# Patient Record
Sex: MALE | Race: WHITE | HISPANIC OR LATINO | Employment: PART TIME | URBAN - METROPOLITAN AREA
[De-identification: names, ages, dates, MRNs, and addresses within clinical notes are randomized per-mention and may not be internally consistent; named-entity substitution may affect disease eponyms.]

---

## 2017-05-09 ENCOUNTER — ALLSCRIPTS OFFICE VISIT (OUTPATIENT)
Dept: OTHER | Facility: OTHER | Age: 22
End: 2017-05-09

## 2017-05-09 DIAGNOSIS — K50.90 CROHN'S DISEASE WITHOUT COMPLICATION (HCC): ICD-10-CM

## 2017-05-17 ENCOUNTER — ANESTHESIA EVENT (OUTPATIENT)
Dept: PERIOP | Facility: HOSPITAL | Age: 22
End: 2017-05-17
Payer: COMMERCIAL

## 2017-05-17 RX ORDER — SODIUM CHLORIDE, SODIUM LACTATE, POTASSIUM CHLORIDE, CALCIUM CHLORIDE 600; 310; 30; 20 MG/100ML; MG/100ML; MG/100ML; MG/100ML
125 INJECTION, SOLUTION INTRAVENOUS CONTINUOUS
Status: CANCELLED | OUTPATIENT
Start: 2017-05-17

## 2017-05-18 ENCOUNTER — ANESTHESIA (OUTPATIENT)
Dept: PERIOP | Facility: HOSPITAL | Age: 22
End: 2017-05-18
Payer: COMMERCIAL

## 2017-05-18 ENCOUNTER — HOSPITAL ENCOUNTER (OUTPATIENT)
Facility: HOSPITAL | Age: 22
Setting detail: OUTPATIENT SURGERY
Discharge: HOME | End: 2017-05-18
Attending: INTERNAL MEDICINE | Admitting: INTERNAL MEDICINE
Payer: COMMERCIAL

## 2017-05-18 ENCOUNTER — GENERIC CONVERSION - ENCOUNTER (OUTPATIENT)
Dept: OTHER | Facility: OTHER | Age: 22
End: 2017-05-18

## 2017-05-18 VITALS
BODY MASS INDEX: 28.29 KG/M2 | OXYGEN SATURATION: 98 % | HEIGHT: 69 IN | TEMPERATURE: 97.9 F | SYSTOLIC BLOOD PRESSURE: 104 MMHG | RESPIRATION RATE: 14 BRPM | WEIGHT: 191 LBS | HEART RATE: 70 BPM | DIASTOLIC BLOOD PRESSURE: 58 MMHG

## 2017-05-18 DIAGNOSIS — K50.90 CROHN'S DISEASE WITHOUT COMPLICATION (HCC): ICD-10-CM

## 2017-05-18 DIAGNOSIS — K62.5 RECTAL BLEEDING: ICD-10-CM

## 2017-05-18 PROCEDURE — 88305 TISSUE EXAM BY PATHOLOGIST: CPT | Performed by: INTERNAL MEDICINE

## 2017-05-18 RX ORDER — PROPOFOL 10 MG/ML
INJECTION, EMULSION INTRAVENOUS AS NEEDED
Status: DISCONTINUED | OUTPATIENT
Start: 2017-05-18 | End: 2017-05-18 | Stop reason: SURG

## 2017-05-18 RX ORDER — LIDOCAINE HYDROCHLORIDE 10 MG/ML
INJECTION, SOLUTION INFILTRATION; PERINEURAL AS NEEDED
Status: DISCONTINUED | OUTPATIENT
Start: 2017-05-18 | End: 2017-05-18 | Stop reason: SURG

## 2017-05-18 RX ORDER — SODIUM CHLORIDE, SODIUM LACTATE, POTASSIUM CHLORIDE, CALCIUM CHLORIDE 600; 310; 30; 20 MG/100ML; MG/100ML; MG/100ML; MG/100ML
INJECTION, SOLUTION INTRAVENOUS CONTINUOUS PRN
Status: DISCONTINUED | OUTPATIENT
Start: 2017-05-18 | End: 2017-05-18 | Stop reason: SURG

## 2017-05-18 RX ADMIN — PROPOFOL 50 MG: 10 INJECTION, EMULSION INTRAVENOUS at 07:56

## 2017-05-18 RX ADMIN — PROPOFOL 150 MG: 10 INJECTION, EMULSION INTRAVENOUS at 07:45

## 2017-05-18 RX ADMIN — PROPOFOL 50 MG: 10 INJECTION, EMULSION INTRAVENOUS at 07:46

## 2017-05-18 RX ADMIN — PROPOFOL 50 MG: 10 INJECTION, EMULSION INTRAVENOUS at 07:54

## 2017-05-18 RX ADMIN — SODIUM CHLORIDE, SODIUM LACTATE, POTASSIUM CHLORIDE, AND CALCIUM CHLORIDE: .6; .31; .03; .02 INJECTION, SOLUTION INTRAVENOUS at 07:19

## 2017-05-18 RX ADMIN — PROPOFOL 50 MG: 10 INJECTION, EMULSION INTRAVENOUS at 07:49

## 2017-05-18 RX ADMIN — LIDOCAINE HYDROCHLORIDE 50 MG: 10 INJECTION, SOLUTION INFILTRATION; PERINEURAL at 07:45

## 2017-05-18 RX ADMIN — PROPOFOL 50 MG: 10 INJECTION, EMULSION INTRAVENOUS at 07:52

## 2017-09-07 ENCOUNTER — GENERIC CONVERSION - ENCOUNTER (OUTPATIENT)
Dept: OTHER | Facility: OTHER | Age: 22
End: 2017-09-07

## 2017-09-07 DIAGNOSIS — K50.90 CROHN'S DISEASE WITHOUT COMPLICATION (HCC): ICD-10-CM

## 2018-01-12 VITALS
HEIGHT: 69 IN | BODY MASS INDEX: 29.33 KG/M2 | WEIGHT: 198 LBS | SYSTOLIC BLOOD PRESSURE: 118 MMHG | DIASTOLIC BLOOD PRESSURE: 60 MMHG

## 2018-01-22 VITALS
BODY MASS INDEX: 29.62 KG/M2 | HEIGHT: 69 IN | DIASTOLIC BLOOD PRESSURE: 70 MMHG | SYSTOLIC BLOOD PRESSURE: 118 MMHG | WEIGHT: 200 LBS

## 2018-02-08 ENCOUNTER — OFFICE VISIT (OUTPATIENT)
Dept: GASTROENTEROLOGY | Facility: CLINIC | Age: 23
End: 2018-02-08
Payer: COMMERCIAL

## 2018-02-08 VITALS
HEART RATE: 80 BPM | SYSTOLIC BLOOD PRESSURE: 115 MMHG | BODY MASS INDEX: 29.2 KG/M2 | WEIGHT: 204 LBS | HEIGHT: 70 IN | DIASTOLIC BLOOD PRESSURE: 60 MMHG

## 2018-02-08 DIAGNOSIS — K50.00 CROHN'S DISEASE OF SMALL INTESTINE WITHOUT COMPLICATION (HCC): Primary | ICD-10-CM

## 2018-02-08 PROCEDURE — 99213 OFFICE O/P EST LOW 20 MIN: CPT | Performed by: INTERNAL MEDICINE

## 2018-02-08 RX ORDER — ADALIMUMAB 40MG/0.8ML
KIT SUBCUTANEOUS
COMMUNITY
Start: 2018-01-26 | End: 2018-07-20 | Stop reason: SDUPTHER

## 2018-02-08 NOTE — PROGRESS NOTES
[facility]      Chief Complaint:  Crohn's disease       HPI:  Jurgen Duarte is a 20y o  year old male who presents with a history of Crohn's disease  Patient has been stable on Humira  He feels slightly fatigued after having his blood drawn but other than this he has no complaints  No joint pains  No ocular complaints  No skin lesions  No dizziness  No nausea or vomiting  No abdominal pain  He has 2-3 normal bowel movements a day which is his usual pattern  No nocturnal symptomatology  Patient denies any weight loss fever or chills  No rectal bleeding  No melena or hematochezia  No other symptomatology at the present time  Historical Information   Past Medical History:   Diagnosis Date    Crohn's disease [facility])      Past Surgical History:   Procedure Laterality Date    [place] COLONOSCOPY FLX DX W/COLLJ [facility] WHEN PFRMD N/A 5/18/2017    Procedure: COLONOSCOPY;  Surgeon: Leslie Arndt MD;  Location: [facility]; Service: Gastroenterology     Social History   History   Alcohol Use No     History   Drug Use No     History   Smoking Status    Never Smoker   Smokeless Tobacco    Not on file     No family history on file  Current Medications: has a current medication list which includes the following prescription(s): adalimumab and humira pen  Review of Systems: Review of Systems   Constitutional: Negative for appetite change, fatigue, fever and unexpected weight change  As per HPI   HENT: Negative for nosebleeds, postnasal drip, sore throat, trouble swallowing and voice change  Eyes: Negative for pain and visual disturbance  Respiratory: Negative for cough, choking, chest tightness and shortness of breath  Cardiovascular: Negative for chest pain, palpitations and leg swelling  Gastrointestinal: Negative for abdominal distention, abdominal pain, anal bleeding, blood in stool, constipation, diarrhea, nausea, rectal pain and vomiting     Endocrine: Negative for cold intolerance, heat intolerance, polydipsia, polyphagia and polyuria  Genitourinary: Negative for difficulty urinating, flank pain, frequency and hematuria  Musculoskeletal: Negative for arthralgias, back pain, joint swelling, myalgias and neck pain  Skin: Negative for color change and rash  Allergic/Immunologic: Negative for immunocompromised state  Neurological: Negative for dizziness, tremors, seizures, syncope, speech difficulty, weakness, light-headedness and headaches  Hematological: Negative for adenopathy  Does not bruise/bleed easily  Psychiatric/Behavioral: Negative for confusion and dysphoric mood  The patient is not nervous/anxious  Physical Exam: Physical Exam   Constitutional: He is oriented to person, place, and time  He appears well-developed and well-nourished  HENT:   Head: Normocephalic  Nose: Nose normal    Mouth/Throat: Oropharynx is clear and moist    Eyes: Conjunctivae and EOM are normal  Pupils are equal, round, and reactive to light  Neck: Normal range of motion  Neck supple  Cardiovascular: Normal rate, regular rhythm, normal heart sounds and intact distal pulses  Pulmonary/Chest: Effort normal and breath sounds normal    Abdominal: Soft  Bowel sounds are normal    Musculoskeletal: Normal range of motion  Neurological: He is alert and oriented to person, place, and time  Skin: Skin is warm and dry  Psychiatric: He has a normal mood and affect  His behavior is normal        Labs:   Results Reviewed               X-Rays & Procedures:   No orders to display       Assessment & Plan: Assessment/Plan    Stable Crohn's disease  Still awaiting lab results  Plan is to see the patient in 6 months  Repeat CBC, LFTs, CRP at that time  No changes in medication    Continue Humira

## 2018-02-21 ENCOUNTER — TELEPHONE (OUTPATIENT)
Dept: GASTROENTEROLOGY | Facility: CLINIC | Age: 23
End: 2018-02-21

## 2018-03-09 ENCOUNTER — TELEPHONE (OUTPATIENT)
Dept: GASTROENTEROLOGY | Facility: CLINIC | Age: 23
End: 2018-03-09

## 2018-03-09 NOTE — TELEPHONE ENCOUNTER
ptn mother wants to know if Dr Rachel Arevalo can order a PPD test the ptn insurance company is asking for one and he doesn't have a PCP

## 2018-03-12 ENCOUNTER — TRANSCRIBE ORDERS (OUTPATIENT)
Dept: GASTROENTEROLOGY | Facility: CLINIC | Age: 23
End: 2018-03-12

## 2018-03-12 DIAGNOSIS — K52.9 COLITIS: Primary | ICD-10-CM

## 2018-07-20 DIAGNOSIS — K50.919 CROHN'S DISEASE WITH COMPLICATION, UNSPECIFIED GASTROINTESTINAL TRACT LOCATION (HCC): Primary | ICD-10-CM

## 2018-07-20 RX ORDER — ADALIMUMAB 40MG/0.8ML
40 KIT SUBCUTANEOUS
Qty: 6 EACH | Refills: 6 | Status: SHIPPED | OUTPATIENT
Start: 2018-07-20 | End: 2019-04-03 | Stop reason: SDUPTHER

## 2018-09-06 ENCOUNTER — OFFICE VISIT (OUTPATIENT)
Dept: GASTROENTEROLOGY | Facility: CLINIC | Age: 23
End: 2018-09-06
Payer: COMMERCIAL

## 2018-09-06 VITALS
HEART RATE: 80 BPM | HEIGHT: 70 IN | BODY MASS INDEX: 29.2 KG/M2 | SYSTOLIC BLOOD PRESSURE: 112 MMHG | DIASTOLIC BLOOD PRESSURE: 70 MMHG | WEIGHT: 204 LBS

## 2018-09-06 DIAGNOSIS — K50.10 CROHN'S DISEASE OF COLON WITHOUT COMPLICATION (HCC): Primary | ICD-10-CM

## 2018-09-06 PROCEDURE — 99213 OFFICE O/P EST LOW 20 MIN: CPT | Performed by: INTERNAL MEDICINE

## 2018-09-06 NOTE — PROGRESS NOTES
[facility]      Chief Complaint: Follow up     HPI:  Judge Zhao is a 24 y o   male who presents here today for follow up of Crohn's disease  He has been on Humira for the past five years which he is tolerating well  I reviewed his most recent labs which showed normal CBC, LFT and normal CRP  He has been taking daily probiotics which he feels has helped him greatly  His bowel movements have been at baseline  Review of Systems:   Constitutional: No fever or chills, feels well, no tiredness, no recent weight gain or weight loss  HENT: No complaints of earache, no hearing loss, no nosebleeds, no nasal discharge, no sore throat, no hoarseness  Eyes: No complaints of eye pain, no red eyes, no discharge from eyes, no itchy eyes  Cardiovascular: No complaints of slow heart rate, no fast heart rate, no chest pain, no palpitations, no leg claudication, no lower extremity edema  Respiratory: No complaints of shortness of breath, no wheezing, no cough, no SOB on exertion, no orthopnea  Gastrointestinal: As noted in HPI  Genitourinary: No complaints of dysuria, no incontinence, no hesitancy, no nocturia  Musculoskeletal: No complaints of arthralgia, no myalgias, no joint swelling or stiffness, no limb pain or swelling  Neurological: No complaints of headache, no confusion, no convulsions, no numbness or tingling, no dizziness or fainting, no limb weakness, no difficulty walking  Skin: No complaints of skin rash or skin lesions, no itching, no skin wound, no dry skin  Hematological/Lymphatic: No complaints of swollen glands, does not bleed easy  Allergic/Immunologic: No immunocompromised state  Endocrine:  No complaints of polyuria, no polydipsia  Psychiatric/Behavioral: is not suicidal, no sleep disturbances, no anxiety or depression, no change in personality, no emotional problems         Historical Information   Past Medical History:   Diagnosis Date    Crohn's disease [facility])      Past Surgical History:   Procedure Laterality Date    [place] COLONOS[place] FLX DX [facility]COLLJ [facility] WHEN PFRMD N/A 5/18/2017    Procedure: COLONOSCOPY;  Surgeon: Ron Winters MD;  Location: [facility]; Service: Gastroenterology     Social History   History   Alcohol Use    Yes     Comment: Social     History   Drug Use No     History   Smoking Status    Never Smoker   Smokeless Tobacco    Not on file     Family History   Problem Relation Age of Onset    Diabetes Father          Current Medications: has a current medication list which includes the following prescription(s): adalimumab and humira pen  Vital Signs: /70   Pulse 80   Ht 5' 10" (1 778 m)   Wt 92 5 kg (204 lb)   BMI 29 27 kg/m²       Physical Exam:   Constitutional  General Appearance: No acute distress, well appearing and well nourished  Head  Normocephalic  Eyes  Conjunctivae and lids: No swelling, erythema, or discharge  Pupils and irises: Equal, round and reactive to light  Ears, Nose, Mouth, and Throat  External inspection of ears and nose: Normal  Nasal mucosa, septum and turbinates: Normal without edema or erythema/   Oropharynx: Normal with no erythema, edema, exudate or lesions  Neck  Normal range of motion  Neck supple  Cardiovascular  Auscultation of the heart: Normal rate and rhythm, normal S1 and S2 without murmurs  Examination of the extremities for edema and/or varicosities: Normal  Pulmonary/Chest  Respiratory effort: No increased work of breathing or signs of respiratory distress  Auscultation of lungs: Clear to auscultation, equal breath sounds bilaterally, no wheezes, rales, no rhonchi  Abdomen  Abdomen: Non-tender, no masses  Liver and spleen: No hepatomegaly or splenomegaly  Musculoskeletal  Gait and station: normal   Digits and Nails: normal without clubbing or cyanosis  Inspection/palpation of joints, bones, and muscles: Normal  Neurological  No nystagmus or asterixis  Skin  Skin and subcutaneous tissue: Normal without rashes or lesions  Lymphatic  Palpation of the lymph nodes in neck: No lymphadenopathy  Psychiatric  Orientation to person, place and time: Normal   Mood and affect: Normal          Labs:  No results found for: ALBUMIN, ALT, AST, BUN, CALCIUM, CL, CHOL, CO2, CREATININE, GFRAA, GFRNONAA, GLU, HDL, HCT, HGB, HGBA1C, LDL, MG, PHOS, PLT, K, PSA, NA, TRIG, WBC      X-Rays & Procedures:   No orders to display           ______________________________________________________________________      Assessment & Plan:       Crohn's disease of colon without complication ([facility] [address] )  Currently well controlled with Humira  He will call if refill needed  Continue daily probiotic - I recommend he switch to VSL #3  I will repeat CBC, LFT and CRP to be done in six months  Follow up in six months time        Attestation:   By signing my name below, ITeresa, attest that this documentation has been prepared under the direction and in the presence of Otilio Biggs MD  Electronically Signed: Whitney Win  09/06/18       Otilio HOFF, personally performed the services described in this documentation  All medical record entries made by the scribe were at my direction and in my presence  I have reviewed the chart and discharge instructions and agree that the record reflects my personal performance and is accurate and complete   Otilio Biggs MD  09/06/18

## 2018-09-13 ENCOUNTER — TELEPHONE (OUTPATIENT)
Dept: GASTROENTEROLOGY | Facility: CLINIC | Age: 23
End: 2018-09-13

## 2019-03-07 ENCOUNTER — OFFICE VISIT (OUTPATIENT)
Dept: GASTROENTEROLOGY | Facility: CLINIC | Age: 24
End: 2019-03-07
Payer: COMMERCIAL

## 2019-03-07 VITALS
BODY MASS INDEX: 30.06 KG/M2 | WEIGHT: 210 LBS | SYSTOLIC BLOOD PRESSURE: 120 MMHG | HEART RATE: 78 BPM | DIASTOLIC BLOOD PRESSURE: 74 MMHG | HEIGHT: 70 IN

## 2019-03-07 DIAGNOSIS — K50.00 CROHN'S DISEASE OF SMALL INTESTINE WITHOUT COMPLICATION (HCC): Primary | ICD-10-CM

## 2019-03-07 PROCEDURE — 99213 OFFICE O/P EST LOW 20 MIN: CPT | Performed by: INTERNAL MEDICINE

## 2019-03-07 NOTE — PROGRESS NOTES
[facility]      Chief Complaint: Crohn's disease follow-up  HPI:  Claudia Glover is a 24 y o   male who presents with a history of Crohn's disease here for a follow-up  Pt is feeling well  He continues to remain stable on Humira  His bowel movements are generally normal, although he reports occasional hard stools and occasional blood in stool  No joint pains  No ocular complaints  No skin lesions  No dark urin  No jaundice  No dizziness  No nausea or vomiting  No abdominal pain  No other symptomatology at the present time  His blood work was normal  LFTs and CBC were normal  CRP was within normal limits as well  He takes the probiotic Align daily  Review of Systems:   Constitutional: No fever or chills, feels well, no tiredness, no recent weight gain or weight loss  HENT: No complaints of earache, no hearing loss, no nosebleeds, no nasal discharge, no sore throat, no hoarseness  Eyes: No complaints of eye pain, no red eyes, no discharge from eyes, no itchy eyes  Cardiovascular: No complaints of slow heart rate, no fast heart rate, no chest pain, no palpitations, no leg claudication, no lower extremity edema  Respiratory: No complaints of shortness of breath, no wheezing, no cough, no SOB on exertion, no orthopnea  Gastrointestinal: As noted in HPI  Genitourinary: No complaints of dysuria, no incontinence, no hesitancy, no nocturia  Musculoskeletal: No complaints of arthralgia, no myalgias, no joint swelling or stiffness, no limb pain or swelling  Neurological: No complaints of headache, no confusion, no convulsions, no numbness or tingling, no dizziness or fainting, no limb weakness, no difficulty walking  Skin: No complaints of skin rash or skin lesions, no itching, no skin wound, no dry skin  Hematological/Lymphatic: No complaints of swollen glands, does not bleed easy  Allergic/Immunologic: No immunocompromised state    Endocrine:  No complaints of polyuria, no polydipsia  Psychiatric/Behavioral: is not suicidal, no sleep disturbances, no anxiety or depression, no change in personality, no emotional problems  Historical Information   Past Medical History:   Diagnosis Date    Crohn's disease [facility])      Past Surgical History:   Procedure Laterality Date    [place] COLONOSCOPY FLX DX W/COLLJ [facility] REHABILITATION WHEN PFRMD N/A 5/18/2017    Procedure: COLONOSCOPY;  Surgeon: Vicente Gloria MD;  Location: [facility]; Service: Gastroenterology     Social History   Social History     Substance and Sexual Activity   Alcohol Use Yes    Comment: Social     Social History     Substance and Sexual Activity   Drug Use No     Social History     Tobacco Use   Smoking Status Never Smoker   Smokeless Tobacco Never Used     Family History   Problem Relation Age of Onset    Diabetes Father          Current Medications: has a current medication list which includes the following prescription(s): adalimumab and humira pen  Vital Signs: /74   Pulse 78   Ht 5' 10" (1 778 m)   Wt 95 3 kg (210 lb)   BMI 30 13 kg/m²       Physical Exam:   Constitutional  General Appearance: No acute distress, well appearing and well nourished  Head  Normocephalic  Eyes  Conjunctivae and lids: No swelling, erythema, or discharge  Pupils and irises: Equal, round and reactive to light  Ears, Nose, Mouth, and Throat  External inspection of ears and nose: Normal  Nasal mucosa, septum and turbinates: Normal without edema or erythema/   Oropharynx: Normal with no erythema, edema, exudate or lesions  Neck  Normal range of motion  Neck supple  Cardiovascular  Auscultation of the heart: Normal rate and rhythm, normal S1 and S2 without murmurs  Examination of the extremities for edema and/or varicosities: Normal  Pulmonary/Chest  Respiratory effort: No increased work of breathing or signs of respiratory distress     Auscultation of lungs: Clear to auscultation, equal breath sounds bilaterally, no wheezes, rales, no rhonchi  Abdomen  Abdomen: Non-tender, no masses  Liver and spleen: No hepatomegaly or splenomegaly  Musculoskeletal  Gait and station: normal   Digits and Nails: normal without clubbing or cyanosis  Inspection/palpation of joints, bones, and muscles: Normal  Neurological  No nystagmus or asterixis  Skin  Skin and subcutaneous tissue: Normal without rashes or lesions  Lymphatic  Palpation of the lymph nodes in neck: No lymphadenopathy  Psychiatric  Orientation to person, place and time: Normal   Mood and affect: Normal          Labs:  No results found for: ALT, AST, BUN, CALCIUM, CL, CHOL, CO2, CREATININE, GFRAA, GFRNONAA, HDL, HCT, HGB, HGBA1C, LDL, MG, PHOS, PLT, K, PSA, NA, TRIG, WBC      X-Rays & Procedures:   No orders to display           ______________________________________________________________________      Assessment & Plan:     Diagnoses and all orders for this visit:    1  Crohn's disease of small intestine without complication ([facility] [address] )  Stable Crohn's disease  Pt is doing well  Plan is to see the patient in 6 months  Repeat CBC, LFTs, CRP at that time  Continue Humira

## 2019-04-01 ENCOUNTER — TELEPHONE (OUTPATIENT)
Dept: GASTROENTEROLOGY | Facility: CLINIC | Age: 24
End: 2019-04-01

## 2019-04-03 DIAGNOSIS — K50.919 CROHN'S DISEASE WITH COMPLICATION, UNSPECIFIED GASTROINTESTINAL TRACT LOCATION (HCC): ICD-10-CM

## 2019-04-03 RX ORDER — ADALIMUMAB 40MG/0.8ML
40 KIT SUBCUTANEOUS
Qty: 6 EACH | Refills: 6 | Status: SHIPPED | OUTPATIENT
Start: 2019-04-03 | End: 2020-03-24

## 2019-10-04 ENCOUNTER — TRANSCRIBE ORDERS (OUTPATIENT)
Dept: GASTROENTEROLOGY | Facility: CLINIC | Age: 24
End: 2019-10-04

## 2019-10-04 ENCOUNTER — OFFICE VISIT (OUTPATIENT)
Dept: GASTROENTEROLOGY | Facility: CLINIC | Age: 24
End: 2019-10-04
Payer: COMMERCIAL

## 2019-10-04 VITALS
RESPIRATION RATE: 16 BRPM | BODY MASS INDEX: 30.64 KG/M2 | SYSTOLIC BLOOD PRESSURE: 120 MMHG | HEART RATE: 76 BPM | WEIGHT: 214 LBS | DIASTOLIC BLOOD PRESSURE: 70 MMHG | HEIGHT: 70 IN

## 2019-10-04 DIAGNOSIS — K50.00 CROHN'S DISEASE OF SMALL INTESTINE WITHOUT COMPLICATION (HCC): Primary | ICD-10-CM

## 2019-10-04 PROCEDURE — 99214 OFFICE O/P EST MOD 30 MIN: CPT | Performed by: INTERNAL MEDICINE

## 2019-10-04 NOTE — PROGRESS NOTES
[facility]      Chief Complaint:  Crohn's disease    HPI:  Cheryl Mckeon is a 21 y o   male who presents with Crohn's disease of the small intestine for many years  Patient is currently on  Humira  He is doing quite well  He has 2 or 3 bowel movements a day  Very rare blood  His weight is stable  He had a recent voluntary weight loss  No fever or chills  No nausea or vomiting  No jaundice  No change in the color of urine or stool  No nocturnal symptomatology  No exertional symptomatology  Patient has no joint complaints  No ocular complaints  No skin complaints  No evidence of extraintestinal manifestation of disease  Recent blood work including CRP, CBC, LFTs, all completely normal   Overall he is doing quite well  He has not had a TB test and quite some time  He has no other GI complaints  No other complaints of any kind  Review of Systems:   Constitutional: No fever or chills, feels well, no tiredness, no recent weight gain or weight loss  HENT: No complaints of earache, no hearing loss, no nosebleeds, no nasal discharge, no sore throat, no hoarseness  Eyes: No complaints of eye pain, no red eyes, no discharge from eyes, no itchy eyes  Cardiovascular: No complaints of slow heart rate, no fast heart rate, no chest pain, no palpitations, no leg claudication, no lower extremity edema  Respiratory: No complaints of shortness of breath, no wheezing, no cough, no SOB on exertion, no orthopnea  Gastrointestinal: As noted in HPI  Genitourinary: No complaints of dysuria, no incontinence, no hesitancy, no nocturia  Musculoskeletal: No complaints of arthralgia, no myalgias, no joint swelling or stiffness, no limb pain or swelling  Neurological: No complaints of headache, no confusion, no convulsions, no numbness or tingling, no dizziness or fainting, no limb weakness, no difficulty walking      Skin: No complaints of skin rash or skin lesions, no itching, no skin wound, no dry skin  Hematological/Lymphatic: No complaints of swollen glands, does not bleed easy  Allergic/Immunologic: No immunocompromised state  Endocrine:  No complaints of polyuria, no polydipsia  Psychiatric/Behavioral: is not suicidal, no sleep disturbances, no anxiety or depression, no change in personality, no emotional problems  Historical Information   Past Medical History:   Diagnosis Date    Crohn's disease [facility])      Past Surgical History:   Procedure Laterality Date    [place] COLONOSCOPY FLX DX W/COLLJ [facility] WHEN PFRMD N/A 5/18/2017    Procedure: COLONOSCOPY;  Surgeon: Ela Luna MD;  Location: [facility]; Service: Gastroenterology     Social History   Social History     Substance and Sexual Activity   Alcohol Use Yes    Comment: Social     Social History     Substance and Sexual Activity   Drug Use No     Social History     Tobacco Use   Smoking Status Never Smoker   Smokeless Tobacco Never Used     Family History   Problem Relation Age of Onset    Diabetes Father          Current Medications: has a current medication list which includes the following prescription(s): adalimumab and humira pen  Vital Signs: /70   Pulse 76   Resp 16   Ht 5' 10" (1 778 m)   Wt 97 1 kg (214 lb)   BMI 30 71 kg/m²       Physical Exam:   Constitutional  General Appearance: No acute distress, well appearing and well nourished  Head  Normocephalic  Eyes  Conjunctivae and lids: No swelling, erythema, or discharge  Pupils and irises: Equal, round and reactive to light  Ears, Nose, Mouth, and Throat  External inspection of ears and nose: Normal  Nasal mucosa, septum and turbinates: Normal without edema or erythema/   Oropharynx: Normal with no erythema, edema, exudate or lesions  Neck  Normal range of motion  Neck supple  Cardiovascular  Auscultation of the heart: Normal rate and rhythm, normal S1 and S2 without murmurs    Examination of the extremities for edema and/or varicosities: Normal  Pulmonary/Chest  Respiratory effort: No increased work of breathing or signs of respiratory distress  Auscultation of lungs: Clear to auscultation, equal breath sounds bilaterally, no wheezes, rales, no rhonchi  Abdomen  Abdomen: Non-tender, no masses  Liver and spleen: No hepatomegaly or splenomegaly  Musculoskeletal  Gait and station: normal   Digits and Nails: normal without clubbing or cyanosis  Inspection/palpation of joints, bones, and muscles: Normal  Neurological  No nystagmus or asterixis  Skin  Skin and subcutaneous tissue: Normal without rashes or lesions  Lymphatic  Palpation of the lymph nodes in neck: No lymphadenopathy  Psychiatric  Orientation to person, place and time: Normal   Mood and affect: Normal          Labs:  No results found for: ALT, AST, BUN, CALCIUM, CL, CHOL, CO2, CREATININE, GFRAA, GFRNONAA, HDL, HCT, HGB, HGBA1C, LDL, MG, PHOS, PLT, K, PSA, NA, TRIG, WBC      X-Rays & Procedures:   No orders to display           ______________________________________________________________________      Assessment & Plan:     Dwight Kearney was seen today for follow-up  Diagnoses and all orders for this visit:    [facility]  -     Quantiferon TB Gold Plus; Future  -     Hepatic function panel; Future  -     C-reactive protein; Future  -     CBC; Future      Patient will continue Humira  We require another QuantiFERON gold  He will repeat his LFTs CBC and CRP in 6 months and see me after that  He is currently stable

## 2019-10-24 ENCOUNTER — TELEPHONE (OUTPATIENT)
Dept: GASTROENTEROLOGY | Facility: CLINIC | Age: 24
End: 2019-10-24

## 2019-10-24 NOTE — LETTER
October 24, 2019         Patient: Dorothy Mitchell   YOB: 1995               To Whom This May Concern,      Dorothy Mitchell is currently under my professional care  He is currently taking HUMIRA but he is able to have the TDAP and Meningitis vaccine  Any questions or concerns please give our office a call at [phone] OPT 2  Thank Shadia Woods MD

## 2019-10-24 NOTE — TELEPHONE ENCOUNTER
ptn wants to know from Dr Lou Martin if hes allowed to get the TDap and menegitis vaccines with him being on humira   If approved please fax a letter to [phone]

## 2019-10-28 ENCOUNTER — TELEPHONE (OUTPATIENT)
Dept: GASTROENTEROLOGY | Facility: CLINIC | Age: 24
End: 2019-10-28

## 2019-10-28 NOTE — TELEPHONE ENCOUNTER
[facility] CALLED AND LEFT A LMOM STATING THE PT'S HUMIRA NEEDED A PRIOR AUTHORIZATION  / CALLED [facility] TO OBTAIN AUTHORIZATION FOR THE MEDICATION, GAVE CLINICALS OVER THE PHONE  STATED THE MEDICATION WAS APPROVED FOR 6 MONTHS  THE WILL FAX OVER THE APPROVAL

## 2019-11-10 ENCOUNTER — HOSPITAL ENCOUNTER (EMERGENCY)
Facility: HOSPITAL | Age: 24
Discharge: HOME | End: 2019-11-10
Attending: EMERGENCY MEDICINE | Admitting: EMERGENCY MEDICINE
Payer: COMMERCIAL

## 2019-11-10 ENCOUNTER — APPOINTMENT (EMERGENCY)
Dept: CT IMAGING | Facility: HOSPITAL | Age: 24
End: 2019-11-10
Payer: COMMERCIAL

## 2019-11-10 VITALS
TEMPERATURE: 97.9 F | BODY MASS INDEX: 27.27 KG/M2 | HEIGHT: 70 IN | OXYGEN SATURATION: 99 % | SYSTOLIC BLOOD PRESSURE: 111 MMHG | WEIGHT: 190.48 LBS | HEART RATE: 80 BPM | RESPIRATION RATE: 18 BRPM | DIASTOLIC BLOOD PRESSURE: 69 MMHG

## 2019-11-10 DIAGNOSIS — R56.9 SEIZURE-LIKE ACTIVITY (HCC): Primary | ICD-10-CM

## 2019-11-10 LAB
ALBUMIN SERPL BCP-MCNC: 2.9 G/DL (ref 3.5–5)
ALP SERPL-CCNC: 53 U/L (ref 46–116)
ALT SERPL W P-5'-P-CCNC: 14 U/L (ref 12–78)
AMPHETAMINES SERPL QL SCN: NEGATIVE
ANION GAP SERPL CALCULATED.3IONS-SCNC: 12 MMOL/L (ref 4–13)
AST SERPL W P-5'-P-CCNC: 23 U/L (ref 5–45)
ATRIAL RATE: 84 BPM
BACTERIA UR QL AUTO: ABNORMAL /HPF
BARBITURATES UR QL: NEGATIVE
BASOPHILS # BLD AUTO: 0.03 THOUSANDS/ΜL (ref 0–0.1)
BASOPHILS NFR BLD AUTO: 0 % (ref 0–1)
BENZODIAZ UR QL: NEGATIVE
BILIRUB DIRECT SERPL-MCNC: 0.02 MG/DL (ref 0–0.2)
BILIRUB SERPL-MCNC: 0.4 MG/DL (ref 0.2–1)
BILIRUB UR QL STRIP: NEGATIVE
BUN SERPL-MCNC: 9 MG/DL (ref 5–25)
CALCIUM SERPL-MCNC: 8 MG/DL (ref 8.3–10.1)
CHLORIDE SERPL-SCNC: 108 MMOL/L (ref 100–108)
CLARITY UR: CLEAR
CO2 SERPL-SCNC: 19 MMOL/L (ref 21–32)
COCAINE UR QL: NEGATIVE
COLOR UR: YELLOW
CREAT SERPL-MCNC: 0.5 MG/DL (ref 0.6–1.3)
EOSINOPHIL # BLD AUTO: 0.06 THOUSAND/ΜL (ref 0–0.61)
EOSINOPHIL NFR BLD AUTO: 1 % (ref 0–6)
ERYTHROCYTE [DISTWIDTH] IN BLOOD BY AUTOMATED COUNT: 12.3 % (ref 11.6–15.1)
ETHANOL SERPL-MCNC: 136 MG/DL (ref 0–3)
GFR SERPL CREATININE-BSD FRML MDRD: 151 ML/MIN/1.73SQ M
GLUCOSE SERPL-MCNC: 99 MG/DL (ref 65–140)
GLUCOSE UR STRIP-MCNC: NEGATIVE MG/DL
HCT VFR BLD AUTO: 43.4 % (ref 36.5–49.3)
HGB BLD-MCNC: 13.7 G/DL (ref 12–17)
HGB UR QL STRIP.AUTO: ABNORMAL
IMM GRANULOCYTES # BLD AUTO: 0.02 THOUSAND/UL (ref 0–0.2)
IMM GRANULOCYTES NFR BLD AUTO: 0 % (ref 0–2)
KETONES UR STRIP-MCNC: NEGATIVE MG/DL
LEUKOCYTE ESTERASE UR QL STRIP: NEGATIVE
LYMPHOCYTES # BLD AUTO: 2.97 THOUSANDS/ΜL (ref 0.6–4.47)
LYMPHOCYTES NFR BLD AUTO: 37 % (ref 14–44)
MCH RBC QN AUTO: 29.4 PG (ref 26.8–34.3)
MCHC RBC AUTO-ENTMCNC: 31.6 G/DL (ref 31.4–37.4)
MCV RBC AUTO: 93 FL (ref 82–98)
METHADONE UR QL: NEGATIVE
MONOCYTES # BLD AUTO: 0.67 THOUSAND/ΜL (ref 0.17–1.22)
MONOCYTES NFR BLD AUTO: 8 % (ref 4–12)
NEUTROPHILS # BLD AUTO: 4.33 THOUSANDS/ΜL (ref 1.85–7.62)
NEUTS SEG NFR BLD AUTO: 54 % (ref 43–75)
NITRITE UR QL STRIP: NEGATIVE
NON-SQ EPI CELLS URNS QL MICRO: ABNORMAL /HPF
NRBC BLD AUTO-RTO: 0 /100 WBCS
OPIATES UR QL SCN: NEGATIVE
P AXIS: 55 DEGREES
PCP UR QL: NEGATIVE
PH UR STRIP.AUTO: 5.5 [PH]
PLATELET # BLD AUTO: 248 THOUSANDS/UL (ref 149–390)
PMV BLD AUTO: 9.2 FL (ref 8.9–12.7)
POTASSIUM SERPL-SCNC: 4.6 MMOL/L (ref 3.5–5.3)
PR INTERVAL: 154 MS
PROT SERPL-MCNC: 6.9 G/DL (ref 6.4–8.2)
PROT UR STRIP-MCNC: NEGATIVE MG/DL
QRS AXIS: 76 DEGREES
QRSD INTERVAL: 98 MS
QT INTERVAL: 374 MS
QTC INTERVAL: 441 MS
RBC # BLD AUTO: 4.66 MILLION/UL (ref 3.88–5.62)
RBC #/AREA URNS AUTO: ABNORMAL /HPF
SODIUM SERPL-SCNC: 139 MMOL/L (ref 136–145)
SP GR UR STRIP.AUTO: 1.01 (ref 1–1.03)
T WAVE AXIS: 36 DEGREES
THC UR QL: NEGATIVE
TROPONIN I SERPL-MCNC: <0.02 NG/ML
UROBILINOGEN UR QL STRIP.AUTO: 0.2 E.U./DL
VENTRICULAR RATE: 84 BPM
WBC # BLD AUTO: 8.08 THOUSAND/UL (ref 4.31–10.16)
WBC #/AREA URNS AUTO: ABNORMAL /HPF

## 2019-11-10 PROCEDURE — 84484 ASSAY OF TROPONIN QUANT: CPT | Performed by: EMERGENCY MEDICINE

## 2019-11-10 PROCEDURE — 85025 COMPLETE CBC W/AUTO DIFF WBC: CPT | Performed by: EMERGENCY MEDICINE

## 2019-11-10 PROCEDURE — 96361 HYDRATE IV INFUSION ADD-ON: CPT

## 2019-11-10 PROCEDURE — 80307 DRUG TEST PRSMV CHEM ANLYZR: CPT | Performed by: EMERGENCY MEDICINE

## 2019-11-10 PROCEDURE — 99285 EMERGENCY DEPT VISIT HI MDM: CPT

## 2019-11-10 PROCEDURE — 36415 COLL VENOUS BLD VENIPUNCTURE: CPT | Performed by: EMERGENCY MEDICINE

## 2019-11-10 PROCEDURE — 93010 ELECTROCARDIOGRAM REPORT: CPT | Performed by: INTERNAL MEDICINE

## 2019-11-10 PROCEDURE — 93005 ELECTROCARDIOGRAM TRACING: CPT

## 2019-11-10 PROCEDURE — 81001 URINALYSIS AUTO W/SCOPE: CPT | Performed by: EMERGENCY MEDICINE

## 2019-11-10 PROCEDURE — 80320 DRUG SCREEN QUANTALCOHOLS: CPT | Performed by: EMERGENCY MEDICINE

## 2019-11-10 PROCEDURE — 99284 EMERGENCY DEPT VISIT MOD MDM: CPT | Performed by: EMERGENCY MEDICINE

## 2019-11-10 PROCEDURE — 80076 HEPATIC FUNCTION PANEL: CPT | Performed by: EMERGENCY MEDICINE

## 2019-11-10 PROCEDURE — 80048 BASIC METABOLIC PNL TOTAL CA: CPT | Performed by: EMERGENCY MEDICINE

## 2019-11-10 PROCEDURE — 70450 CT HEAD/BRAIN W/O DYE: CPT

## 2019-11-10 PROCEDURE — 96374 THER/PROPH/DIAG INJ IV PUSH: CPT

## 2019-11-10 RX ORDER — LORAZEPAM 2 MG/ML
1 INJECTION INTRAMUSCULAR ONCE
Status: COMPLETED | OUTPATIENT
Start: 2019-11-10 | End: 2019-11-10

## 2019-11-10 RX ADMIN — LORAZEPAM 1 MG: 2 INJECTION INTRAMUSCULAR; INTRAVENOUS at 04:05

## 2019-11-10 RX ADMIN — SODIUM CHLORIDE 2000 ML: 0.9 INJECTION, SOLUTION INTRAVENOUS at 04:05

## 2019-11-10 NOTE — ED PROVIDER NOTES
History  Chief Complaint   Patient presents with    Seizure - New Onset     Per EMS pt was out drinking tonight with friends and had a 20 second seizure  21year old male patient with history of Crohn's disease presents to the emergency department for evaluation of a possible seizure  The patient was out with friends tonight, had several drinks but states not more than he is used to having, was being driven home by a friend who is sober mode another friend noted him having seizure-like activity for approximately 20-30 seconds  The patient has been having seizure-like activity while here however he is cold, is conscious alert oriented during these times, he returns to baseline normal mental status immediately after  I feel that it is likely one of these types of episodes of shaking from the sensation of being cold more that I believe that the patient is having seizures  Patient be evaluated with a differential diagnosis to include but not be limited to alcohol intoxication, first-time seizure, electrolyte abnormality  History provided by:  Patient   used: No    Seizure - New Onset   Seizure activity on arrival: no    Seizure type:  Myoclonic  Initial focality:  None  Episode characteristics: abnormal movements and generalized shaking    Postictal symptoms: no confusion, no memory loss and no somnolence    Return to baseline: yes    Severity:  Mild  Timing:  Once  Context: not cerebral palsy, not emotional upset, not fever, not flashing visual stimuli and not intracranial lesion        Prior to Admission Medications   Prescriptions Last Dose Informant Patient Reported? Taking?    HUMIRA PEN 40 MG/0 8ML PNKT  Self No No   Sig: Inject 0 8 mL (40 mg total) as directed every 14 (fourteen) days   adalimumab (HUMIRA) 40 mg/0 8 mL PSKT  Self Yes No   Sig: Inject 40 mg under the skin once      Facility-Administered Medications: None       Past Medical History:   Diagnosis Date    Crohn's disease [facility])        Past Surgical History:   Procedure Laterality Date    [place] COLONOSCOPY FLX DX W/COLLJ [facility] INPATIENT REHAB[facility]ITATION WHEN PFRMD N/A 5/18/2017    Procedure: COLONOSCOPY;  Surgeon: Flash Agudelo MD;  Location: [facility]; Service: Gastroenterology       Family History   Problem Relation Age of Onset    Diabetes Father      I have reviewed and agree with the history as documented  Social History     Tobacco Use    Smoking status: Never Smoker    Smokeless tobacco: Never Used   Substance Use Topics    Alcohol use: Yes     Comment: Social    Drug use: No        Review of Systems   Neurological: Positive for seizures  All other systems reviewed and are negative  Physical Exam  Physical Exam   Constitutional: He is oriented to person, place, and time  He appears well-developed and well-nourished  No distress  HENT:   Head: Normocephalic and atraumatic  Right Ear: External ear normal    Left Ear: External ear normal    Eyes: Conjunctivae and EOM are normal  Right eye exhibits no discharge  Left eye exhibits no discharge  No scleral icterus  Neck: Normal range of motion  Neck supple  No JVD present  No tracheal deviation present  No thyromegaly present  Cardiovascular: Normal rate and regular rhythm  Pulmonary/Chest: Effort normal and breath sounds normal  No stridor  No respiratory distress  He has no wheezes  He has no rales  Abdominal: Soft  Bowel sounds are normal  He exhibits no distension  There is no tenderness  Musculoskeletal: Normal range of motion  He exhibits no edema, tenderness or deformity  Neurological: He is alert and oriented to person, place, and time  No cranial nerve deficit  Coordination normal    Skin: Skin is warm and dry  He is not diaphoretic  Psychiatric: He has a normal mood and affect  His behavior is normal    Nursing note and vitals reviewed        Vital Signs  ED Triage Vitals [11/10/19 0327]   Temperature Pulse Respirations Blood Pressure SpO2   97 9 °F (36 6 °C) 73 20 130/81 100 %      Temp Source Heart Rate Source Patient Position - Orthostatic VS BP Location FiO2 (%)   Oral Monitor Lying Right arm --      Pain Score       No Pain           Vitals:    11/10/19 0327 11/10/19 0445 11/10/19 0500 11/10/19 0630   BP: 130/81 114/66 97/51 111/69   Pulse: 73 86 86 80   Patient Position - Orthostatic VS: Lying Lying Lying Lying         Visual Acuity      ED Medications  Medications   sodium chloride 0 9 % bolus 2,000 mL (0 mL Intravenous Stopped 11/10/19 0706)   LORazepam (ATIVAN) 2 mg/mL injection 1 mg (1 mg Intravenous Given 11/10/19 0405)       Diagnostic Studies  Results Reviewed     Procedure Component Value Units Date/Time    Rapid drug screen, urine [[id number]]  (Normal) Collected:  11/10/19 0426    Lab Status:  Final result Specimen:  Urine, Clean Catch Updated:  11/10/19 1041     Amph/Meth UR Negative     Barbiturate Ur Negative     Benzodiazepine Urine Negative     Cocaine Urine Negative     Methadone Urine Negative     Opiate Urine Negative     PCP Ur Negative     THC Urine Negative    Narrative:       FOR MEDICAL PURPOSES ONLY  IF CONFIRMATION NEEDED PLEASE CONTACT THE LAB WITHIN 5 DAYS      Drug Screen Cutoff Levels:  AMPHETAMINE/METHAMPHETAMINES  1000 ng/mL  BARBITURATES     200 ng/mL  BENZODIAZEPINES     200 ng/mL  COCAINE      300 ng/mL  METHADONE      300 ng/mL  OPIATES      300 ng/mL  PHENCYCLIDINE     25 ng/mL  THC       50 ng/mL      Basic metabolic panel [[id number]]  (Abnormal) Collected:  11/10/19 0616    Lab Status:  Final result Specimen:  Blood from Arm, Right Updated:  11/10/19 0646     Sodium 139 mmol/L      Potassium 4 6 mmol/L      Chloride 108 mmol/L      CO2 19 mmol/L      ANION GAP 12 mmol/L      BUN 9 mg/dL      Creatinine 0 50 mg/dL      Glucose 99 mg/dL      Calcium 8 0 mg/dL      eGFR 151 ml/min/1 73sq m     Narrative:       [place] guidelines for Chronic Kidney Disease (CKD):     Stage 1 with normal or high GFR (GFR > 90 mL/min/1 73 square meters)    Stage 2 Mild CKD (GFR = 60-89 mL/min/1 73 square meters)    Stage 3A Moderate CKD (GFR = 45-59 mL/min/1 73 square meters)    Stage 3B Moderate CKD (GFR = 30-44 mL/min/1 73 square meters)    Stage 4 Severe CKD (GFR = 15-29 mL/min/1 73 square meters)    Stage 5 End Stage CKD (GFR <15 mL/min/1 73 square meters)  Note: GFR calculation is accurate only with a steady state creatinine    Hepatic function panel [[id number]]  (Abnormal) Collected:  11/10/19 0616    Lab Status:  Final result Specimen:  Blood from Arm, Right Updated:  11/10/19 0646     Total Bilirubin 0 40 mg/dL      Bilirubin, Direct 0 02 mg/dL      Alkaline Phosphatase 53 U/L      AST 23 U/L      ALT 14 U/L      Total Protein 6 9 g/dL      Albumin 2 9 g/dL     Urine Microscopic [[id number]]  (Abnormal) Collected:  11/10/19 0616    Lab Status:  Final result Specimen:  Urine, Other Updated:  11/10/19 0641     RBC, UA 1-2 /hpf      WBC, UA None Seen /hpf      Epithelial Cells None Seen /hpf      Bacteria, UA None Seen /hpf     UA w Reflex to Microscopic w Reflex to Culture [[id number]]  (Abnormal) Collected:  11/10/19 0616    Lab Status:  Final result Specimen:  Urine, Other Updated:  11/10/19 0623     Color, UA Yellow     Clarity, UA Clear     Specific Gravity, UA 1 010     pH, UA 5 5     Leukocytes, UA Negative     Nitrite, UA Negative     Protein, UA Negative mg/dl      Glucose, UA Negative mg/dl      Ketones, UA Negative mg/dl      Urobilinogen, UA 0 2 E U /dl      Bilirubin, UA Negative     Blood, UA Small    Ethanol [[id number]]  (Abnormal) Collected:  11/10/19 0426    Lab Status:  Final result Specimen:  Blood from Arm, Right Updated:  11/10/19 0448     Ethanol Lvl 136 mg/dL     Troponin I [[id number]]  (Normal) Collected:  11/10/19 0401    Lab Status:  Final result Specimen:  Blood from Arm, Right Updated:  11/10/19 0433     Troponin I <0 02 ng/mL     CBC and differential [[id number]] Collected:  11/10/19 0401    Lab Status:  Final result Specimen:  Blood from Arm, Right Updated:  11/10/19 0409     WBC 8 08 Thousand/uL      RBC 4 66 Million/uL      Hemoglobin 13 7 g/dL      Hematocrit 43 4 %      MCV 93 fL      MCH 29 4 pg      MCHC 31 6 g/dL      RDW 12 3 %      MPV 9 2 fL      Platelets 850 Thousands/uL      nRBC 0 /100 WBCs      Neutrophils Relative 54 %      Immat GRANS % 0 %      Lymphocytes Relative 37 %      Monocytes Relative 8 %      Eosinophils Relative 1 %      Basophils Relative 0 %      Neutrophils Absolute 4 33 Thousands/µL      Immature Grans Absolute 0 02 Thousand/uL      Lymphocytes Absolute 2 97 Thousands/µL      Monocytes Absolute 0 67 Thousand/µL      Eosinophils Absolute 0 06 Thousand/µL      Basophils Absolute 0 03 Thousands/µL                  CT head without contrast   Final Result by Toribio Crawford DO (11/10 1245)   No acute intracranial abnormality  Workstation performed: [id number]                    Procedures  Procedures       ED Course                               MDM  Number of Diagnoses or Management Options  Seizure-like activity [facility]): new and requires workup     Amount and/or Complexity of Data Reviewed  Clinical lab tests: reviewed and ordered  Tests in the radiology section of CPT®: reviewed and ordered  Decide to obtain previous medical records or to obtain history from someone other than the patient: yes  Review and summarize past medical records: yes    Patient Progress  Patient progress: stable      Disposition  Final diagnoses:   Seizure-like activity ([address] )     Time reflects when diagnosis was documented in both MDM as applicable and the Disposition within this note     Time User Action Codes Description Comment    11/10/2019  6:27 AM Maritza Hale Add [R56 9] Seizure-like activity [facility])       ED Disposition     ED Disposition Condition Date/Time Comment    Discharge Stable Sun Nov 10, 2019  6:27 AM Lucie Quinonez discharge to home/self care              Follow-up Information     Follow up With Specialties Details Why Contact Info Additional Information    [address] [facility] Emergency Department Emergency Medicine  As needed [address] [facility] [address] [phone] [place] [facility], [address], [place], [address]    Alejandro Harris MD Neurology   [place] [address]  [phone]             Discharge Medication List as of 11/10/2019  6:28 AM      CONTINUE these medications which have NOT CHANGED    Details   adalimumab (HUMIRA) 40 mg/0 8 mL PSKT Inject 40 mg under the skin once, Historical Med      HUMIRA PEN 40 MG/0 8ML PNKT Inject 0 8 mL (40 mg total) as directed every 14 (fourteen) days, Starting Wed 4/3/2019, Normal           No discharge procedures on file      ED Provider  Electronically Signed by           Mony Adame DO  11/12/19 4510

## 2019-11-18 ENCOUNTER — TELEPHONE (OUTPATIENT)
Dept: GASTROENTEROLOGY | Facility: CLINIC | Age: 24
End: 2019-11-18

## 2019-11-18 NOTE — TELEPHONE ENCOUNTER
Patient wants to know can he get a Yellow Fever vaccination, he is currently getting Humara injections  Need to fax [phone] over a letter of approval for vaccine

## 2019-11-18 NOTE — LETTER
November 18, 2019     Inocente Wright  [address] [facility] [facility] [address]    Patient: Inocente Wright   YOB: 1995       To Whom This May Concern,        Inocente Wright is currently under my professional care  The yellow fever vaccine is not recommended for patients on immunosuppressants  If you have any questions or concerns please give our office a call at [phone] OPT #2        Thank Marjan Retana MD

## 2019-11-18 NOTE — TELEPHONE ENCOUNTER
Please tell him that because yellow fever is a live vaccine, it is not recommended for people on immunosuppressants

## 2020-01-20 ENCOUNTER — TELEPHONE (OUTPATIENT)
Dept: GASTROENTEROLOGY | Facility: CLINIC | Age: 25
End: 2020-01-20

## 2020-01-20 NOTE — TELEPHONE ENCOUNTER
He had vaccine questions  Told him to consult his regular physician    Told him he could not take live vaccines, only recombinant ones

## 2020-03-24 ENCOUNTER — TELEPHONE (OUTPATIENT)
Dept: GASTROENTEROLOGY | Facility: CLINIC | Age: 25
End: 2020-03-24

## 2020-03-24 DIAGNOSIS — K50.919 CROHN'S DISEASE WITH COMPLICATION, UNSPECIFIED GASTROINTESTINAL TRACT LOCATION (HCC): ICD-10-CM

## 2020-03-24 RX ORDER — ADALIMUMAB 40MG/0.8ML
KIT SUBCUTANEOUS
Qty: 28 ML | Refills: 10 | Status: SHIPPED | OUTPATIENT
Start: 2020-03-24 | End: 2020-04-03

## 2020-04-03 DIAGNOSIS — K50.919 CROHN'S DISEASE WITH COMPLICATION, UNSPECIFIED GASTROINTESTINAL TRACT LOCATION (HCC): ICD-10-CM

## 2020-04-03 RX ORDER — ADALIMUMAB 40MG/0.8ML
KIT SUBCUTANEOUS
Qty: 4 EACH | Refills: 10 | Status: SHIPPED | OUTPATIENT
Start: 2020-04-03

## 2020-04-07 ENCOUNTER — OFFICE VISIT (OUTPATIENT)
Dept: GASTROENTEROLOGY | Facility: CLINIC | Age: 25
End: 2020-04-07
Payer: COMMERCIAL

## 2020-04-07 VITALS
SYSTOLIC BLOOD PRESSURE: 110 MMHG | BODY MASS INDEX: 27.35 KG/M2 | HEART RATE: 86 BPM | HEIGHT: 70 IN | RESPIRATION RATE: 16 BRPM | WEIGHT: 191 LBS | DIASTOLIC BLOOD PRESSURE: 70 MMHG

## 2020-04-07 DIAGNOSIS — K50.00 CROHN'S DISEASE OF SMALL INTESTINE WITHOUT COMPLICATION (HCC): Primary | ICD-10-CM

## 2020-04-07 PROCEDURE — 99214 OFFICE O/P EST MOD 30 MIN: CPT | Performed by: INTERNAL MEDICINE

## 2020-04-07 PROCEDURE — 3008F BODY MASS INDEX DOCD: CPT | Performed by: INTERNAL MEDICINE

## 2020-04-07 PROCEDURE — 1036F TOBACCO NON-USER: CPT | Performed by: INTERNAL MEDICINE

## 2020-06-03 ENCOUNTER — OFFICE VISIT (OUTPATIENT)
Dept: INTERNAL MEDICINE CLINIC | Facility: CLINIC | Age: 25
End: 2020-06-03
Payer: COMMERCIAL

## 2020-06-03 VITALS
RESPIRATION RATE: 16 BRPM | DIASTOLIC BLOOD PRESSURE: 70 MMHG | HEART RATE: 70 BPM | HEIGHT: 70 IN | WEIGHT: 191 LBS | TEMPERATURE: 98.3 F | BODY MASS INDEX: 27.35 KG/M2 | SYSTOLIC BLOOD PRESSURE: 106 MMHG | OXYGEN SATURATION: 99 %

## 2020-06-03 DIAGNOSIS — Z13.6 SCREENING FOR CARDIOVASCULAR CONDITION: ICD-10-CM

## 2020-06-03 DIAGNOSIS — Z00.00 ANNUAL PHYSICAL EXAM: Primary | ICD-10-CM

## 2020-06-03 PROBLEM — K50.90 CROHN'S DISEASE (HCC): Status: ACTIVE | Noted: 2017-05-09

## 2020-06-03 PROCEDURE — 99385 PREV VISIT NEW AGE 18-39: CPT | Performed by: INTERNAL MEDICINE

## 2020-06-04 ENCOUNTER — TELEPHONE (OUTPATIENT)
Dept: GASTROENTEROLOGY | Facility: CLINIC | Age: 25
End: 2020-06-04

## 2020-06-29 ENCOUNTER — TELEPHONE (OUTPATIENT)
Dept: GASTROENTEROLOGY | Facility: CLINIC | Age: 25
End: 2020-06-29

## 2020-07-14 DIAGNOSIS — K50.00 CROHN'S DISEASE OF SMALL INTESTINE WITHOUT COMPLICATION (HCC): ICD-10-CM

## 2020-07-14 DIAGNOSIS — Z20.822 ENCOUNTER FOR LABORATORY TESTING FOR COVID-19 VIRUS: ICD-10-CM

## 2020-07-14 PROCEDURE — U0003 INFECTIOUS AGENT DETECTION BY NUCLEIC ACID (DNA OR RNA); SEVERE ACUTE RESPIRATORY SYNDROME CORONAVIRUS 2 (SARS-COV-2) (CORONAVIRUS DISEASE [COVID-19]), AMPLIFIED PROBE TECHNIQUE, MAKING USE OF HIGH THROUGHPUT TECHNOLOGIES AS DESCRIBED BY CMS-2020-01-R: HCPCS

## 2020-07-17 ENCOUNTER — ANESTHESIA EVENT (OUTPATIENT)
Dept: GASTROENTEROLOGY | Facility: HOSPITAL | Age: 25
End: 2020-07-17

## 2020-07-17 RX ORDER — SODIUM CHLORIDE, SODIUM LACTATE, POTASSIUM CHLORIDE, CALCIUM CHLORIDE 600; 310; 30; 20 MG/100ML; MG/100ML; MG/100ML; MG/100ML
125 INJECTION, SOLUTION INTRAVENOUS CONTINUOUS
Status: CANCELLED | OUTPATIENT
Start: 2020-07-17

## 2020-07-18 ENCOUNTER — ANESTHESIA (OUTPATIENT)
Dept: GASTROENTEROLOGY | Facility: HOSPITAL | Age: 25
End: 2020-07-18

## 2020-07-18 ENCOUNTER — HOSPITAL ENCOUNTER (OUTPATIENT)
Dept: GASTROENTEROLOGY | Facility: HOSPITAL | Age: 25
Setting detail: OUTPATIENT SURGERY
Discharge: HOME | End: 2020-07-18
Attending: INTERNAL MEDICINE | Admitting: INTERNAL MEDICINE
Payer: COMMERCIAL

## 2020-07-18 VITALS
TEMPERATURE: 97 F | WEIGHT: 175.93 LBS | BODY MASS INDEX: 25.19 KG/M2 | OXYGEN SATURATION: 100 % | RESPIRATION RATE: 22 BRPM | HEIGHT: 70 IN | SYSTOLIC BLOOD PRESSURE: 116 MMHG | DIASTOLIC BLOOD PRESSURE: 59 MMHG | HEART RATE: 51 BPM

## 2020-07-18 DIAGNOSIS — K50.00 CROHN'S DISEASE OF SMALL INTESTINE WITHOUT COMPLICATION (HCC): ICD-10-CM

## 2020-07-18 DIAGNOSIS — Z20.822 ENCOUNTER FOR LABORATORY TESTING FOR COVID-19 VIRUS: ICD-10-CM

## 2020-07-18 LAB — SARS-COV-2 RNA RESP QL NAA+PROBE: NEGATIVE

## 2020-07-18 PROCEDURE — 88305 TISSUE EXAM BY PATHOLOGIST: CPT | Performed by: PATHOLOGY

## 2020-07-18 PROCEDURE — 87635 SARS-COV-2 COVID-19 AMP PRB: CPT | Performed by: INTERNAL MEDICINE

## 2020-07-18 PROCEDURE — 88342 IMHCHEM/IMCYTCHM 1ST ANTB: CPT | Performed by: PATHOLOGY

## 2020-07-18 PROCEDURE — 45380 COLONOSCOPY AND BIOPSY: CPT | Performed by: INTERNAL MEDICINE

## 2020-07-18 RX ORDER — SODIUM CHLORIDE, SODIUM LACTATE, POTASSIUM CHLORIDE, CALCIUM CHLORIDE 600; 310; 30; 20 MG/100ML; MG/100ML; MG/100ML; MG/100ML
INJECTION, SOLUTION INTRAVENOUS CONTINUOUS PRN
Status: DISCONTINUED | OUTPATIENT
Start: 2020-07-18 | End: 2020-07-18 | Stop reason: SURG

## 2020-07-18 RX ORDER — PROPOFOL 10 MG/ML
INJECTION, EMULSION INTRAVENOUS AS NEEDED
Status: DISCONTINUED | OUTPATIENT
Start: 2020-07-18 | End: 2020-07-18 | Stop reason: SURG

## 2020-07-18 RX ADMIN — SODIUM CHLORIDE, SODIUM LACTATE, POTASSIUM CHLORIDE, AND CALCIUM CHLORIDE: .6; .31; .03; .02 INJECTION, SOLUTION INTRAVENOUS at 09:33

## 2020-07-18 RX ADMIN — PROPOFOL 30 MG: 10 INJECTION, EMULSION INTRAVENOUS at 09:46

## 2020-07-18 RX ADMIN — PROPOFOL 100 MG: 10 INJECTION, EMULSION INTRAVENOUS at 09:35

## 2020-07-18 RX ADMIN — PROPOFOL 30 MG: 10 INJECTION, EMULSION INTRAVENOUS at 09:38

## 2020-07-18 RX ADMIN — PROPOFOL 20 MG: 10 INJECTION, EMULSION INTRAVENOUS at 09:41

## 2020-07-18 RX ADMIN — PROPOFOL 30 MG: 10 INJECTION, EMULSION INTRAVENOUS at 09:43

## 2020-07-18 RX ADMIN — PROPOFOL 30 MG: 10 INJECTION, EMULSION INTRAVENOUS at 09:50

## 2020-07-18 NOTE — H&P
History and Physical - [facility]  Davi Vazquez 31 y o  male MRN: [id number]                  HPI: Davi Vazquez is a 31y o  year old male who presents for colonoscopy for rectal bleeding, 10 history of Crohn's disease  Last colonoscopy 3 years ago      REVIEW OF SYSTEMS: Per the HPI, and otherwise unremarkable  Historical Information   Past Medical History:   Diagnosis Date    Crohn's disease [facility])      Past Surgical History:   Procedure Laterality Date    COLONOSCOPY      [place] COLONOSCOPY FLX DX W/COLLJ SPEC WHEN PFRMD N/A 5/18/2017    Procedure: COLONOSCOPY;  Surgeon: Kirstin Kirkland MD;  Location: [facility]; Service: Gastroenterology     Social History   Social History     Substance and Sexual Activity   Alcohol Use Yes    Frequency: Monthly or less    Drinks per session: 1 or 2    Comment: Social     Social History     Substance and Sexual Activity   Drug Use No     Social History     Tobacco Use   Smoking Status Never Smoker   Smokeless Tobacco Never Used     Family History   Problem Relation Age of Onset    Diabetes Father     Hypertension Mother        Meds/Allergies       (Not in a hospital admission)    No Known Allergies    Objective     Blood pressure 131/61, pulse 57, temperature (!) 97 3 °F (36 3 °C), temperature source Temporal, resp  rate 16, height 5' 10" (1 778 m), weight 79 8 kg (175 lb 14 8 oz), SpO2 100 %        PHYSICAL EXAM    Gen: NAD  CV: RRR  CHEST: Clear  ABD: soft, NT/ND  EXT: no edema  Neuro: AAO      ASSESSMENT/PLAN:  This is a 31y o  year old male here for rectal bleeding, history of Crohn's    PLAN:   Procedure:  Colonoscopy

## 2020-07-18 NOTE — ANESTHESIA POSTPROCEDURE EVALUATION
Post-Op Assessment Note    CV Status:  Stable    Pain management: adequate     Mental Status:  Alert and awake   Hydration Status:  Euvolemic   PONV Controlled:  Controlled   Airway Patency:  Patent   Post Op Vitals Reviewed: Yes      Staff: CRNA           BP   118/78   Temp  98   Pulse  76   Resp   18   SpO2   100

## 2020-07-18 NOTE — ANESTHESIA PREPROCEDURE EVALUATION
Review of Systems/Medical History  Patient summary reviewed        Cardiovascular  Negative cardio ROS    Pulmonary  Negative pulmonary ROS        GI/Hepatic  Negative GI/hepatic ROS          Negative  ROS        Endo/Other  Negative endo/other ROS      GYN  Negative gynecology ROS          Hematology  Negative hematology ROS      Musculoskeletal  Negative musculoskeletal ROS        Neurology  Negative neurology ROS      Psychology   Negative psychology ROS              Physical Exam    Airway    Mallampati score: II  TM Distance: >3 FB  Neck ROM: full     Dental       Cardiovascular  Comment: Negative ROS, Rhythm: regular, Rate: normal, Cardiovascular exam normal    Pulmonary  Pulmonary exam normal Breath sounds clear to auscultation,     Other Findings        Anesthesia Plan  ASA Score- 2     Anesthesia Type- IV sedation with anesthesia with ASA Monitors  Additional Monitors:   Airway Plan:         Plan Factors-    Induction- intravenous  Postoperative Plan-     Informed Consent- Anesthetic plan and risks discussed with patient  I personally reviewed this patient with the CRNA  Discussed and agreed on the Anesthesia Plan with the CRNA  Robbie Grove

## 2020-07-18 NOTE — DISCHARGE INSTRUCTIONS
Colonoscopy   WHAT YOU NEED TO KNOW:   A colonoscopy is a procedure to examine the inside of your colon (intestine) with a scope  Polyps or tissue growths may have been removed during your colonoscopy  It is normal to feel bloated and to have some abdominal discomfort  You should be passing gas  If you have hemorrhoids or you had polyps removed, you may have a small amount of bleeding  DISCHARGE INSTRUCTIONS:   Seek care immediately if:   · You have a large amount of bright red blood in your bowel movements  · Your abdomen is hard and firm and you have severe pain  · You have sudden trouble breathing  Contact your healthcare provider if:   · You develop a rash or hives  · You have a fever within 24 hours of your procedure  · You have not had a bowel movement for 3 days after your procedure  · You have questions or concerns about your condition or care  Activity:   · Do not lift, strain, or run  for 3 days after your procedure  · Rest after your procedure  You have been given medicine to relax you  Do not  drive or make important decisions until the day after your procedure  Return to your normal activity as directed  · Relieve gas and discomfort from bloating  by lying on your right side with a heating pad on your abdomen  You may need to take short walks to help the gas move out  Eat small meals until bloating is relieved  If you had polyps removed: For 7 days after your procedure:  · Do not  take aspirin  · Do not  go on long car rides  Help prevent constipation:   · Eat a variety of healthy foods  Healthy foods include fruit, vegetables, whole-grain breads, low-fat dairy products, beans, lean meat, and fish  Ask if you need to be on a special diet  Your healthcare provider may recommend that you eat high-fiber foods such as cooked beans  Fiber helps you have regular bowel movements  · Drink liquids as directed    Adults should drink between 9 and 13 eight-ounce cups of liquid every day  Ask what amount is best for you  For most people, good liquids to drink are water, juice, and milk  · Exercise as directed  Talk to your healthcare provider about the best exercise plan for you  Exercise can help prevent constipation, decrease your blood pressure and improve your health  Follow up with your healthcare provider as directed:  Write down your questions so you remember to ask them during your visits  © 2017 [employer] Information is for End User's use only and may not be sold, redistributed or otherwise used for commercial purposes  All illustrations and images included in CareNotes® are the copyrighted property of [employer]  or [employer] Bolaños  The above information is an  only  It is not intended as medical advice for individual conditions or treatments  Talk to your doctor, nurse or pharmacist before following any medical regimen to see if it is safe and effective for you

## 2020-07-19 LAB — SARS-COV-2 RNA SPEC QL NAA+PROBE: NOT DETECTED

## 2020-07-28 ENCOUNTER — TELEPHONE (OUTPATIENT)
Dept: GASTROENTEROLOGY | Facility: CLINIC | Age: 25
End: 2020-07-28

## 2020-11-13 ENCOUNTER — TELEPHONE (OUTPATIENT)
Dept: GASTROENTEROLOGY | Facility: CLINIC | Age: 25
End: 2020-11-13

## 2021-04-01 ENCOUNTER — OFFICE VISIT (OUTPATIENT)
Dept: GASTROENTEROLOGY | Facility: CLINIC | Age: 26
End: 2021-04-01
Payer: COMMERCIAL

## 2021-04-01 VITALS
HEIGHT: 70 IN | HEART RATE: 88 BPM | BODY MASS INDEX: 26.77 KG/M2 | SYSTOLIC BLOOD PRESSURE: 132 MMHG | WEIGHT: 187 LBS | DIASTOLIC BLOOD PRESSURE: 78 MMHG

## 2021-04-01 DIAGNOSIS — K50.00 CROHN'S DISEASE OF SMALL INTESTINE WITHOUT COMPLICATION (HCC): Primary | ICD-10-CM

## 2021-04-01 PROCEDURE — 99213 OFFICE O/P EST LOW 20 MIN: CPT | Performed by: INTERNAL MEDICINE

## 2021-04-01 NOTE — PROGRESS NOTES
[facility]      Chief Complaint:   Crohn's disease    HPI:  Emilia Flores is a 31 y o   male who presents with  History of Crohn's disease currently very well maintained on Humira  Patient only has rectal bleeding rarely  Usually involves eating popcorn  He has absolutely no other complaints  He denies any abdominal pain, change in habits, change in stool caliber, melena, hematochezia, weight loss, fever or chills  He has no ocular symptoms  No skin changes  No change in the color urine or stool  No joint issues  No complaints whatsoever at this time  Recent CBC and complete metabolic profile were normal       Review of Systems:   Constitutional: No fever or chills, feels well, no tiredness, no recent weight gain or weight loss  HENT: No complaints of earache, no hearing loss, no nosebleeds, no nasal discharge, no sore throat, no hoarseness  Eyes: No complaints of eye pain, no red eyes, no discharge from eyes, no itchy eyes  Cardiovascular: No complaints of slow heart rate, no fast heart rate, no chest pain, no palpitations, no leg claudication, no lower extremity edema  Respiratory: No complaints of shortness of breath, no wheezing, no cough, no SOB on exertion, no orthopnea  Gastrointestinal: As noted in HPI  Genitourinary: No complaints of dysuria, no incontinence, no hesitancy, no nocturia  Musculoskeletal: No complaints of arthralgia, no myalgias, no joint swelling or stiffness, no limb pain or swelling  Neurological: No complaints of headache, no confusion, no convulsions, no numbness or tingling, no dizziness or fainting, no limb weakness, no difficulty walking  Skin: No complaints of skin rash or skin lesions, no itching, no skin wound, no dry skin  Hematological/Lymphatic: No complaints of swollen glands, does not bleed easy  Allergic/Immunologic: No immunocompromised state  Endocrine:  No complaints of polyuria, no polydipsia  Psychiatric/Behavioral: is not suicidal, no sleep disturbances, no anxiety or depression, no change in personality, no emotional problems  Historical Information   Past Medical History:   Diagnosis Date    Crohn's disease [facility])      Past Surgical History:   Procedure Laterality Date    COLONOSCOPY      [place] COLONOSCOPY FLX DX W/COLLJ SPEC WHEN PFRMD N/A 5/18/2017    Procedure: COLONOSCOPY;  Surgeon: Eusebio Roberts MD;  Location: [facility]; Service: Gastroenterology     Social History   Social History     Substance and Sexual Activity   Alcohol Use Yes    Frequency: Monthly or less    Drinks per session: 1 or 2    Comment: Social     Social History     Substance and Sexual Activity   Drug Use No     Social History     Tobacco Use   Smoking Status Never Smoker   Smokeless Tobacco Never Used     Family History   Problem Relation Age of Onset    Diabetes Father     Hypertension Mother          Current Medications: has a current medication list which includes the following prescription(s): humira pen  Vital Signs: /78   Pulse 88   Ht 5' 10" (1 778 m)   Wt 84 8 kg (187 lb)   BMI 26 83 kg/m²       Physical Exam:   Constitutional  General Appearance: No acute distress, well appearing and well nourished  Head  Normocephalic  Eyes  Conjunctivae and lids: No swelling, erythema, or discharge  Pupils and irises: Equal, round and reactive to light  Ears, Nose, Mouth, and Throat  External inspection of ears and nose: Normal  Nasal mucosa, septum and turbinates: Normal without edema or erythema/   Oropharynx: Normal with no erythema, edema, exudate or lesions  Neck  Normal range of motion  Neck supple  Cardiovascular  Auscultation of the heart: Normal rate and rhythm, normal S1 and S2 without murmurs  Examination of the extremities for edema and/or varicosities: Normal  Pulmonary/Chest  Respiratory effort: No increased work of breathing or signs of respiratory distress     Auscultation of lungs: Clear to auscultation, equal breath sounds bilaterally, no wheezes, rales, no rhonchi  Abdomen  Abdomen: Non-tender, no masses  Liver and spleen: No hepatomegaly or splenomegaly  Musculoskeletal  Gait and station: normal   Digits and Nails: normal without clubbing or cyanosis  Inspection/palpation of joints, bones, and muscles: Normal  Neurological  No nystagmus or asterixis  Skin  Skin and subcutaneous tissue: Normal without rashes or lesions  Lymphatic  Palpation of the lymph nodes in neck: No lymphadenopathy  Psychiatric  Orientation to person, place and time: Normal   Mood and affect: Normal          Labs:  Lab Results   Component Value Date    ALT 14 11/10/2019    AST 23 11/10/2019    BUN 9 11/10/2019    CALCIUM 8 0 (L) 11/10/2019     11/10/2019    CO2 19 (L) 11/10/2019    CREATININE 0 50 (L) 11/10/2019    HCT 43 4 11/10/2019    HGB 13 7 11/10/2019     11/10/2019    K 4 6 11/10/2019    WBC 8 08 11/10/2019         X-Rays & Procedures:   No orders to display           ______________________________________________________________________      Assessment & Plan:     Diagnoses and all orders for this visit:    Crohn's disease of small intestine without complication ([address] )  -     Hepatic function panel; Future  -     CBC; Future  -     Quantiferon TB Gold Plus; Future       doing very well overall  Told to avoid popcorn since this causes bleeding  He will continue the Humira  Repeat QuantiFERON CBC and LFTs in 6 months  Follow-up office 1 year

## 2021-06-04 ENCOUNTER — OFFICE VISIT (OUTPATIENT)
Dept: INTERNAL MEDICINE CLINIC | Facility: CLINIC | Age: 26
End: 2021-06-04
Payer: COMMERCIAL

## 2021-06-04 VITALS
BODY MASS INDEX: 27.2 KG/M2 | OXYGEN SATURATION: 98 % | HEIGHT: 70 IN | HEART RATE: 88 BPM | SYSTOLIC BLOOD PRESSURE: 122 MMHG | DIASTOLIC BLOOD PRESSURE: 72 MMHG | TEMPERATURE: 98 F | WEIGHT: 190 LBS

## 2021-06-04 DIAGNOSIS — E78.2 MIXED HYPERLIPIDEMIA: ICD-10-CM

## 2021-06-04 DIAGNOSIS — Z00.00 ANNUAL PHYSICAL EXAM: Primary | ICD-10-CM

## 2021-06-04 PROCEDURE — 99395 PREV VISIT EST AGE 18-39: CPT | Performed by: INTERNAL MEDICINE

## 2021-06-04 NOTE — PROGRESS NOTES
Assessment/Plan:       No significant new abnormalities noted  He is going to work on the diet for the cholesterol  Continue follow-up with GI for his Crohn's  Continue diet and exercise  Encouraged regular follow-up with eye doctor and dentist      Quality Measures:       Return in about 1 year (around 6/4/2022) for [address]  No problem-specific Assessment & Plan notes found for this encounter  Diagnoses and all orders for this visit:    Annual physical exam    Mixed hyperlipidemia          Subjective:      Patient ID: Steven Buenrostro is a 31 y o  male  Patient comes in today for yearly physical   He has no complaints today  Current with GI  He did his blood work in his cholesterol was a little high  No prior for comparison  Admits he could do better with his diet  ALLERGIES:  No Known Allergies    CURRENT MEDICATIONS:    Current Outpatient Medications:     HUMIRA PEN 40 MG/0 8ML PNKT, INJECT ONE PEN (40MG TOTAL) UNDER THE SKIN EVERY 14 DAYS , Disp: 4 each, Rfl: 10    ACTIVE PROBLEM LIST:  Patient Active Problem List   Diagnosis    Crohn's disease ([place] [address] )    Mixed hyperlipidemia       PAST MEDICAL HISTORY:  Past Medical History:   Diagnosis Date    Crohn's disease ([place] [address] )        PAST SURGICAL HISTORY:  Past Surgical History:   Procedure Laterality Date    COLONOSCOPY      [place] COLONOSCOPY FLX DX W/COLLJ SPEC WHEN PFRMD N/A 5/18/2017    Procedure: COLONOSCOPY;  Surgeon: Eva Verde MD;  Location[facility];   Service: Gastroenterology       FAMILY HISTORY:  Family History   Problem Relation Age of Onset    Diabetes Father     Hypertension Mother        SOCIAL HISTORY:  Social History     Socioeconomic History    Marital status: Single     Spouse name: Not on file    Number of children: Not on file    Years of education: Not on file    Highest education level: Not on file   Occupational History    Not on file   Social Needs    Financial resource strain: Not on file   May-Rustam insecurity     Worry: Not on file     Inability: Not on file    Transportation needs     Medical: Not on file     Non-medical: Not on file   Tobacco Use    Smoking status: Never Smoker    Smokeless tobacco: Never Used   Substance and Sexual Activity    Alcohol use: Yes     Frequency: Monthly or less     Drinks per session: 1 or 2     Comment: Social    Drug use: No    Sexual activity: Not on file   Lifestyle    Physical activity     Days per week: Not on file     Minutes per session: Not on file    Stress: Not on file   Relationships    Social connections     Talks on phone: Not on file     Gets together: Not on file     Attends Jain service: Not on file     Active member of club or organization: Not on file     Attends meetings of clubs or organizations: Not on file     Relationship status: Not on file    Intimate partner violence     Fear of current or ex partner: Not on file     Emotionally abused: Not on file     Physically abused: Not on file     Forced sexual activity: Not on file   Other Topics Concern    Not on file   Social History Narrative    Not on file       Review of Systems   Respiratory: Negative for shortness of breath  Cardiovascular: Negative for chest pain  Gastrointestinal: Negative for abdominal pain  Objective:  Vitals:    06/04/21 1535   BP: 122/72   Cuff Size: Adult   Pulse: 88   Temp: 98 °F (36 7 °C)   TempSrc: Temporal   SpO2: 98%   Weight: 86 2 kg (190 lb)   Height: 5' 10" (1 778 m)     Body mass index is 27 26 kg/m²  Physical Exam  Vitals signs and nursing note reviewed  Constitutional:       Appearance: He is well-developed  HENT:      Head: Atraumatic  Right Ear: External ear normal       Left Ear: External ear normal    Eyes:      Conjunctiva/sclera: Conjunctivae normal       Pupils: Pupils are equal, round, and reactive to light  Neck:      Musculoskeletal: Normal range of motion and neck supple     Cardiovascular:      Rate and Rhythm: Normal rate and regular rhythm  Heart sounds: Normal heart sounds  No murmur  Pulmonary:      Effort: Pulmonary effort is normal       Breath sounds: Normal breath sounds  Abdominal:      Palpations: Abdomen is soft  Tenderness: There is no abdominal tenderness  Musculoskeletal: Normal range of motion  Lymphadenopathy:      Cervical: No cervical adenopathy  Skin:     General: Skin is warm and dry  Findings: No rash  Neurological:      Mental Status: He is alert and oriented to person, place, and time  Cranial Nerves: No cranial nerve deficit  RESULTS:    No results found for this or any previous visit (from the past 1008 hour(s))  This note was created with voice recognition software  Phonic, grammatical and spelling errors may be present within the note as a result

## 2021-06-04 NOTE — PATIENT INSTRUCTIONS

## 2021-10-12 ENCOUNTER — TELEPHONE (OUTPATIENT)
Dept: GASTROENTEROLOGY | Facility: CLINIC | Age: 26
End: 2021-10-12

## 2021-10-13 ENCOUNTER — TRANSCRIBE ORDERS (OUTPATIENT)
Dept: GASTROENTEROLOGY | Facility: CLINIC | Age: 26
End: 2021-10-13

## 2021-10-13 ENCOUNTER — TELEPHONE (OUTPATIENT)
Dept: GASTROENTEROLOGY | Facility: CLINIC | Age: 26
End: 2021-10-13

## 2021-10-13 DIAGNOSIS — R76.12 POSITIVE QUANTIFERON-TB GOLD TEST: Primary | ICD-10-CM

## 2021-10-13 DIAGNOSIS — K50.00 CROHN'S DISEASE OF SMALL INTESTINE WITHOUT COMPLICATION (HCC): ICD-10-CM

## 2021-10-15 ENCOUNTER — HOSPITAL ENCOUNTER (OUTPATIENT)
Dept: RADIOLOGY | Facility: HOSPITAL | Age: 26
Discharge: HOME | End: 2021-10-15
Attending: INTERNAL MEDICINE
Payer: COMMERCIAL

## 2021-10-15 DIAGNOSIS — R76.12 POSITIVE QUANTIFERON-TB GOLD TEST: ICD-10-CM

## 2021-10-15 PROCEDURE — 71046 X-RAY EXAM CHEST 2 VIEWS: CPT

## 2021-10-19 ENCOUNTER — TELEPHONE (OUTPATIENT)
Dept: GASTROENTEROLOGY | Facility: CLINIC | Age: 26
End: 2021-10-19

## 2021-10-19 DIAGNOSIS — K50.00 CROHN'S DISEASE OF SMALL INTESTINE WITHOUT COMPLICATION (HCC): Primary | ICD-10-CM

## 2022-01-17 ENCOUNTER — TELEPHONE (OUTPATIENT)
Dept: GASTROENTEROLOGY | Facility: CLINIC | Age: 27
End: 2022-01-17

## 2022-01-17 DIAGNOSIS — K50.00 CROHN'S DISEASE OF SMALL INTESTINE WITHOUT COMPLICATION (HCC): Primary | ICD-10-CM

## 2022-01-17 NOTE — TELEPHONE ENCOUNTER
Pt mother called can we send labs in the mail to patient that he would usually have prior to coming to office in April please

## 2022-01-18 ENCOUNTER — TELEPHONE (OUTPATIENT)
Dept: GASTROENTEROLOGY | Facility: CLINIC | Age: 27
End: 2022-01-18

## 2022-01-18 NOTE — TELEPHONE ENCOUNTER
Please see my last note - he had one on 10/09/2021 which was positive  I called him to tell him at that time that we had to repeat it to rule out false positive and if still positive he needs ID consult  So, if he did not have one after the first one he needs one

## 2022-01-18 NOTE — TELEPHONE ENCOUNTER
PT HAD ONE DONE BACK IN 10/21 AT [facility]   DO YOU WANT HIM TO REPEAT AGAIN?

## 2022-01-18 NOTE — TELEPHONE ENCOUNTER
Tell her yes and order CMP, CBC, Sed rate and CRP, and see if he saw Infectious Disease as we discussed!

## 2022-04-07 ENCOUNTER — OFFICE VISIT (OUTPATIENT)
Dept: GASTROENTEROLOGY | Facility: CLINIC | Age: 27
End: 2022-04-07
Payer: COMMERCIAL

## 2022-04-07 VITALS
BODY MASS INDEX: 30.35 KG/M2 | WEIGHT: 212 LBS | SYSTOLIC BLOOD PRESSURE: 120 MMHG | DIASTOLIC BLOOD PRESSURE: 80 MMHG | HEIGHT: 70 IN | HEART RATE: 74 BPM | OXYGEN SATURATION: 99 %

## 2022-04-07 DIAGNOSIS — K50.90 CROHN'S DISEASE IN REMISSION (HCC): Primary | ICD-10-CM

## 2022-04-07 PROCEDURE — 3008F BODY MASS INDEX DOCD: CPT | Performed by: INTERNAL MEDICINE

## 2022-04-07 PROCEDURE — 99213 OFFICE O/P EST LOW 20 MIN: CPT | Performed by: INTERNAL MEDICINE

## 2022-04-07 NOTE — PROGRESS NOTES
[facility]      Chief Complaint:  Crohn's    HPI:  Norah Rueda is a 31 y o   male who presents with Crohn's disease of the small bowel  He has been on Humira for approximately 10 years  Currently the patient is doing very well  He had a positive interferon gold test last year but on repeat it was negative  Chest x-ray was also negative  It was deemed to be a false-positive  Patient currently is doing very well with no abdominal pain, diarrhea, rectal bleeding, or any other issues  He has about 2 bowel movements a day which is normal for him  He has no weight loss  No fever or chills  He does not have any joint pains, ocular pains, or any other issues  Most recently his blood work including CBC CMP, and sed rate were all normal       Review of Systems:   Constitutional: No fever or chills, feels well, no tiredness, no recent weight gain or weight loss  HENT: No complaints of earache, no hearing loss, no nosebleeds, no nasal discharge, no sore throat, no hoarseness  Eyes: No complaints of eye pain, no red eyes, no discharge from eyes, no itchy eyes  Cardiovascular: No complaints of slow heart rate, no fast heart rate, no chest pain, no palpitations, no leg claudication, no lower extremity edema  Respiratory: No complaints of shortness of breath, no wheezing, no cough, no SOB on exertion, no orthopnea  Gastrointestinal: As noted in HPI  Genitourinary: No complaints of dysuria, no incontinence, no hesitancy, no nocturia  Musculoskeletal: No complaints of arthralgia, no myalgias, no joint swelling or stiffness, no limb pain or swelling  Neurological: No complaints of headache, no confusion, no convulsions, no numbness or tingling, no dizziness or fainting, no limb weakness, no difficulty walking  Skin: No complaints of skin rash or skin lesions, no itching, no skin wound, no dry skin      Hematological/Lymphatic: No complaints of swollen glands, does not bleed easy    Allergic/Immunologic: No immunocompromised state  Endocrine:  No complaints of polyuria, no polydipsia  Psychiatric/Behavioral: is not suicidal, no sleep disturbances, no anxiety or depression, no change in personality, no emotional problems  Historical Information   Past Medical History:   Diagnosis Date    Crohn's disease [facility])      Past Surgical History:   Procedure Laterality Date    COLONOSCOPY      OR COLONOSCOPY FLX DX W/COLLJ SPEC WHEN PFRMD N/A 5/18/2017    Procedure: COLONOSCOPY;  Surgeon: Rick Timmons MD;  Location: [facility]; Service: Gastroenterology     Social History   Social History     Substance and Sexual Activity   Alcohol Use Yes    Comment: Social     Social History     Substance and Sexual Activity   Drug Use No     Social History     Tobacco Use   Smoking Status Never Smoker   Smokeless Tobacco Never Used     Family History   Problem Relation Age of Onset    Diabetes Father     Hypertension Mother          Current Medications: has a current medication list which includes the following prescription(s): humira pen  Vital Signs: /80   Pulse 74   Ht 5' 10" (1 778 m)   Wt 96 2 kg (212 lb)   SpO2 99%   BMI 30 42 kg/m²       Physical Exam:   Constitutional  General Appearance: No acute distress, well appearing and well nourished  Head  Normocephalic  Eyes  Conjunctivae and lids: No swelling, erythema, or discharge  Pupils and irises: Equal, round and reactive to light  Ears, Nose, Mouth, and Throat  External inspection of ears and nose: Normal  Nasal mucosa, septum and turbinates: Normal without edema or erythema/   Oropharynx: Normal with no erythema, edema, exudate or lesions  Neck  Normal range of motion  Neck supple  Cardiovascular  Auscultation of the heart: Normal rate and rhythm, normal S1 and S2 without murmurs    Examination of the extremities for edema and/or varicosities: Normal  Pulmonary/Chest  Respiratory effort: No increased work of breathing or signs of respiratory distress  Auscultation of lungs: Clear to auscultation, equal breath sounds bilaterally, no wheezes, rales, no rhonchi  Abdomen  Abdomen: Non-tender, no masses  Liver and spleen: No hepatomegaly or splenomegaly  Musculoskeletal  Gait and station: normal   Digits and Nails: normal without clubbing or cyanosis  Inspection/palpation of joints, bones, and muscles: Normal  Neurological  No nystagmus or asterixis  Skin  Skin and subcutaneous tissue: Normal without rashes or lesions  Lymphatic  Palpation of the lymph nodes in neck: No lymphadenopathy  Psychiatric  Orientation to person, place and time: Normal   Mood and affect: Normal          Labs:  Lab Results   Component Value Date    ALT 14 11/10/2019    AST 23 11/10/2019    BUN 9 11/10/2019    CALCIUM 8 0 (L) 11/10/2019     11/10/2019    CO2 19 (L) 11/10/2019    CREATININE 0 50 (L) 11/10/2019    HCT 43 4 11/10/2019    HGB 13 7 11/10/2019     11/10/2019    K 4 6 11/10/2019    WBC 8 08 11/10/2019         X-Rays & Procedures:   No orders to display           ______________________________________________________________________      Assessment & Plan:     Diagnoses and all orders for this visit:    Crohn's disease in remission ([facility] [address] )  -     Hepatic function panel; Future  -     C-reactive protein; Future  -     CBC; Future      Patient is in complete remission at this point  He will continue the Humira at its present dosage  We will repeat blood work and see him again in 1 year

## 2022-07-29 ENCOUNTER — TELEPHONE (OUTPATIENT)
Dept: INTERNAL MEDICINE CLINIC | Facility: CLINIC | Age: 27
End: 2022-07-29

## 2022-07-29 DIAGNOSIS — E78.2 MIXED HYPERLIPIDEMIA: Primary | ICD-10-CM

## 2022-07-29 NOTE — TELEPHONE ENCOUNTER
As per Dr Verner Lute, "Yes, I put orders in since last year the cholesterol was just a little high"    Spoke with the patient and advised him of this

## 2022-08-12 ENCOUNTER — OFFICE VISIT (OUTPATIENT)
Dept: INTERNAL MEDICINE CLINIC | Facility: CLINIC | Age: 27
End: 2022-08-12
Payer: COMMERCIAL

## 2022-08-12 VITALS
RESPIRATION RATE: 16 BRPM | DIASTOLIC BLOOD PRESSURE: 68 MMHG | TEMPERATURE: 99 F | HEART RATE: 73 BPM | OXYGEN SATURATION: 98 % | SYSTOLIC BLOOD PRESSURE: 110 MMHG | WEIGHT: 211 LBS | HEIGHT: 70 IN | BODY MASS INDEX: 30.21 KG/M2

## 2022-08-12 DIAGNOSIS — Z00.00 ANNUAL PHYSICAL EXAM: Primary | ICD-10-CM

## 2022-08-12 PROCEDURE — 3725F SCREEN DEPRESSION PERFORMED: CPT | Performed by: INTERNAL MEDICINE

## 2022-08-12 PROCEDURE — 99395 PREV VISIT EST AGE 18-39: CPT | Performed by: INTERNAL MEDICINE

## 2022-08-12 NOTE — PATIENT INSTRUCTIONS

## 2022-08-12 NOTE — PROGRESS NOTES
[facility] INTERNAL MEDICINE [place]    NAME: Gerardo Galan  AGE: 28 y o  SEX: male  : 1995     DATE: 2022     Assessment and Plan:     Problem List Items Addressed This Visit    None     Visit Diagnoses     Annual physical exam    -  Primary          Immunizations and preventive care screenings were discussed with patient today  Appropriate education was printed on patient's after visit summary  Counseling:  Exercise: the importance of regular exercise/physical activity was discussed  Recommend exercise 3-5 times per week for at least 30 minutes  No follow-ups on file  Chief Complaint:     Chief Complaint   Patient presents with    Physical Exam      History of Present Illness:     Adult Annual Physical   Patient here for a comprehensive physical exam  The patient reports no problems  Diet and Physical Activity  Diet/Nutrition: well balanced diet  Exercise: varied  Depression Screening  PHQ-2/9 Depression Screening    Little interest or pleasure in doing things: 0 - not at all  Feeling down, depressed, or hopeless: 0 - not at all  PHQ-2 Score: 0  PHQ-2 Interpretation: Negative depression screen       General Health  Sleep: sleeps well  Hearing: normal - bilateral   Vision: goes for regular eye exams  Dental: regular dental visits   Health  History of STDs?: no      Review of Systems:     Review of Systems   Respiratory: Negative for shortness of breath  Cardiovascular: Negative for chest pain  Gastrointestinal: Negative for abdominal pain  Past Medical History:     Past Medical History:   Diagnosis Date    Crohn's disease [facility])       Past Surgical History:     Past Surgical History:   Procedure Laterality Date    COLONOSCOPY      [place] COLONOSCOPY FLX DX W/COLLJ SPEC WHEN PFRMD N/A 2017    Procedure: COLONOSCOPY;  Surgeon: Eusebio Roberts MD;  Location: [facility];   Service: Gastroenterology      Social History:     Social History     Socioeconomic History    Marital status: Single     Spouse name: None    Number of children: None    Years of education: None    Highest education level: None   Occupational History    None   Tobacco Use    Smoking status: Never Smoker    Smokeless tobacco: Never Used   Vaping Use    Vaping Use: Never used   Substance and Sexual Activity    Alcohol use: Yes     Comment: Social    Drug use: No    Sexual activity: Yes     Partners: Female     Birth control/protection: Condom Male   Other Topics Concern    None   Social History Narrative    None     Social Determinants of Health     Financial Resource Strain: Not on file   Food Insecurity: Not on file   Transportation Needs: Not on file   Physical Activity: Not on file   Stress: Not on file   Social Connections: Not on file   Intimate Partner Violence: Not on file   Housing Stability: Not on file      Family History:     Family History   Problem Relation Age of Onset    Diabetes Father     Hypertension Mother       Current Medications:     Current Outpatient Medications   Medication Sig Dispense Refill    HUMIRA PEN 40 MG/0 8ML PNKT INJECT ONE PEN (40MG TOTAL) UNDER THE SKIN EVERY 14 DAYS  4 each 10     No current facility-administered medications for this visit  Allergies:     No Known Allergies   Physical Exam:     /68 (BP Location: Left arm, Patient Position: Sitting, Cuff Size: Adult)   Pulse 73   Temp 99 °F (37 2 °C) (Tympanic)   Resp 16   Ht 5' 10" (1 778 m)   Wt 95 7 kg (211 lb)   SpO2 98%   BMI 30 28 kg/m²     Physical Exam  Vitals and nursing note reviewed  Constitutional:       Appearance: He is well-developed  HENT:      Head: Normocephalic and atraumatic        Right Ear: Tympanic membrane, ear canal and external ear normal       Left Ear: Tympanic membrane, ear canal and external ear normal    Eyes:      Conjunctiva/sclera: Conjunctivae normal  Cardiovascular:      Rate and Rhythm: Normal rate and regular rhythm  Heart sounds: No murmur heard  Pulmonary:      Effort: Pulmonary effort is normal  No respiratory distress  Breath sounds: Normal breath sounds  Abdominal:      Palpations: Abdomen is soft  Tenderness: There is no abdominal tenderness  Musculoskeletal:      Cervical back: Neck supple  Right lower leg: No edema  Left lower leg: No edema  Skin:     General: Skin is warm and dry  Neurological:      Mental Status: He is alert and oriented to person, place, and time     Psychiatric:         Mood and Affect: Mood normal          Behavior: Behavior normal           Jennifer Lepe MD   [address]

## 2022-10-04 ENCOUNTER — TELEPHONE (OUTPATIENT)
Dept: GASTROENTEROLOGY | Facility: CLINIC | Age: 27
End: 2022-10-04

## 2022-10-04 NOTE — TELEPHONE ENCOUNTER
Called patient and reached voicemail   I left a message on machine asking for patient to call me back so we can discuss submitting a new application for his free drug program

## 2022-10-04 NOTE — TELEPHONE ENCOUNTER
Patients current authorization for Humira expires on 11/5/2022  Will submit new authorization       Patient also requires [facility]

## 2022-10-18 ENCOUNTER — TELEPHONE (OUTPATIENT)
Dept: INTERNAL MEDICINE CLINIC | Facility: CLINIC | Age: 27
End: 2022-10-18

## 2022-10-18 NOTE — TELEPHONE ENCOUNTER
Went to urgent care for pharyngitis    Was prescribed penacillin by urgent ,     Is it ok to take it with taking Humira med?

## 2022-10-18 NOTE — TELEPHONE ENCOUNTER
Yes, but you may want to check with your specialist because sometimes when you are sick they may ask you to not take the humira for one dose

## 2023-04-21 ENCOUNTER — APPOINTMENT (OUTPATIENT)
Dept: RADIOLOGY | Facility: CLINIC | Age: 28
End: 2023-04-21

## 2023-04-21 DIAGNOSIS — Z22.7 LATENT TUBERCULOSIS: ICD-10-CM

## 2023-07-21 ENCOUNTER — PREP FOR PROCEDURE (OUTPATIENT)
Age: 28
End: 2023-07-21

## 2023-07-21 ENCOUNTER — TELEPHONE (OUTPATIENT)
Age: 28
End: 2023-07-21

## 2023-07-21 DIAGNOSIS — K50.10 CROHN'S DISEASE OF LARGE INTESTINE WITHOUT COMPLICATION (HCC): Primary | ICD-10-CM

## 2023-07-21 NOTE — TELEPHONE ENCOUNTER
Scheduled date of colonoscopy (as of today):10/2/23    Physician performing colonoscopy:Dr Mendieta    Location of colonoscopy:[place]    Bowel prep reviewed with patient:Miralax/Dulcolax    Instructions reviewed with patient by: states office sent prep instructions to his parents    Clearances: N/A

## 2023-09-25 ENCOUNTER — TELEPHONE (OUTPATIENT)
Age: 28
End: 2023-09-25

## 2023-09-25 NOTE — TELEPHONE ENCOUNTER
Called [facility] - Reminded patient that he is scheduled for colonoscopy on Monday 10/02/23. Patient needs of purchase 238 g bottle of Miralax and box dulcolax tablets. Patient will need to mix miralaxi with 64 oz bottle of clear liquid. NOT RED, BLUE OR PURPLE. Reminded patient  1) Patient will need a ride to and from hospital.   2) Patient on all liquid diet on Sunday  3) Hospital will call between 2 - 6 Friday.

## 2023-10-02 ENCOUNTER — HOSPITAL ENCOUNTER (OUTPATIENT)
Dept: GASTROENTEROLOGY | Facility: HOSPITAL | Age: 28
Setting detail: OUTPATIENT SURGERY
Discharge: HOME | End: 2023-10-02
Attending: INTERNAL MEDICINE
Payer: COMMERCIAL

## 2023-10-02 ENCOUNTER — ANESTHESIA EVENT (OUTPATIENT)
Dept: GASTROENTEROLOGY | Facility: HOSPITAL | Age: 28
End: 2023-10-02

## 2023-10-02 ENCOUNTER — ANESTHESIA (OUTPATIENT)
Dept: GASTROENTEROLOGY | Facility: HOSPITAL | Age: 28
End: 2023-10-02

## 2023-10-02 VITALS
DIASTOLIC BLOOD PRESSURE: 56 MMHG | SYSTOLIC BLOOD PRESSURE: 108 MMHG | OXYGEN SATURATION: 100 % | HEART RATE: 53 BPM | HEIGHT: 70 IN | BODY MASS INDEX: 26.83 KG/M2 | TEMPERATURE: 97.9 F | WEIGHT: 187.39 LBS | RESPIRATION RATE: 20 BRPM

## 2023-10-02 DIAGNOSIS — K50.10 CROHN'S DISEASE OF LARGE INTESTINE WITHOUT COMPLICATION (HCC): ICD-10-CM

## 2023-10-02 PROCEDURE — 88312 SPECIAL STAINS GROUP 1: CPT | Performed by: STUDENT IN AN ORGANIZED HEALTH CARE EDUCATION/TRAINING PROGRAM

## 2023-10-02 PROCEDURE — 88305 TISSUE EXAM BY PATHOLOGIST: CPT | Performed by: STUDENT IN AN ORGANIZED HEALTH CARE EDUCATION/TRAINING PROGRAM

## 2023-10-02 RX ORDER — SODIUM CHLORIDE, SODIUM LACTATE, POTASSIUM CHLORIDE, CALCIUM CHLORIDE 600; 310; 30; 20 MG/100ML; MG/100ML; MG/100ML; MG/100ML
INJECTION, SOLUTION INTRAVENOUS CONTINUOUS PRN
Status: DISCONTINUED | OUTPATIENT
Start: 2023-10-02 | End: 2023-10-02

## 2023-10-02 RX ORDER — PROPOFOL 10 MG/ML
INJECTION, EMULSION INTRAVENOUS AS NEEDED
Status: DISCONTINUED | OUTPATIENT
Start: 2023-10-02 | End: 2023-10-02

## 2023-10-02 RX ORDER — LIDOCAINE HYDROCHLORIDE 20 MG/ML
INJECTION, SOLUTION EPIDURAL; INFILTRATION; INTRACAUDAL; PERINEURAL AS NEEDED
Status: DISCONTINUED | OUTPATIENT
Start: 2023-10-02 | End: 2023-10-02

## 2023-10-02 RX ORDER — LIDOCAINE HYDROCHLORIDE 10 MG/ML
0.5 INJECTION, SOLUTION EPIDURAL; INFILTRATION; INTRACAUDAL; PERINEURAL ONCE AS NEEDED
Status: CANCELLED | OUTPATIENT
Start: 2023-10-02

## 2023-10-02 RX ORDER — SODIUM CHLORIDE, SODIUM LACTATE, POTASSIUM CHLORIDE, CALCIUM CHLORIDE 600; 310; 30; 20 MG/100ML; MG/100ML; MG/100ML; MG/100ML
50 INJECTION, SOLUTION INTRAVENOUS CONTINUOUS
Status: CANCELLED | OUTPATIENT
Start: 2023-10-02

## 2023-10-02 RX ADMIN — PROPOFOL 150 MG: 10 INJECTION, EMULSION INTRAVENOUS at 11:06

## 2023-10-02 RX ADMIN — SODIUM CHLORIDE, SODIUM LACTATE, POTASSIUM CHLORIDE, AND CALCIUM CHLORIDE: .6; .31; .03; .02 INJECTION, SOLUTION INTRAVENOUS at 10:25

## 2023-10-02 RX ADMIN — LIDOCAINE HYDROCHLORIDE 100 MG: 20 INJECTION, SOLUTION EPIDURAL; INFILTRATION; INTRACAUDAL; PERINEURAL at 11:02

## 2023-10-02 RX ADMIN — PROPOFOL 50 MG: 10 INJECTION, EMULSION INTRAVENOUS at 11:13

## 2023-10-02 RX ADMIN — PROPOFOL 150 MG: 10 INJECTION, EMULSION INTRAVENOUS at 11:02

## 2023-10-02 NOTE — ANESTHESIA PREPROCEDURE EVALUATION
Procedure:  COLONOSCOPY    Relevant Problems   CARDIO   (+) Mixed hyperlipidemia      Other   (+) Crohn's disease ([address])      Denies recent fever, cough or other symptom of upper respiratory tract infection. Last PO intake was glass of water at 8:50 am, case delayed 2 hours  Physical Exam    Airway    Mallampati score: III  TM Distance: >3 FB  Neck ROM: full     Dental   No notable dental hx     Cardiovascular      Pulmonary      Other Findings        Anesthesia Plan  ASA Score- 2     Anesthesia Type- IV sedation with anesthesia with ASA Monitors. Additional Monitors:   Airway Plan:     Comment: I discussed the risks and benefits of IV sedation anesthesia including the possibility of the need to convert to general anesthesia and the potential risk of awareness. Plan Factors-Exercise tolerance (METS): >4 METS. Exercise comment: Able to climb two flights of stairs without cardiopulmonary limitation. Chart reviewed. Existing labs reviewed. Patient is not a current smoker. Patient did not smoke on day of surgery. Induction- intravenous. Postoperative Plan-     Informed Consent- Anesthetic plan and risks discussed with patient.

## 2023-10-02 NOTE — ANESTHESIA POSTPROCEDURE EVALUATION
Post-Op Assessment Note    CV Status:  Stable    Pain management: adequate     Mental Status:  Alert and awake   Hydration Status:  Euvolemic   PONV Controlled:  Controlled   Airway Patency:  Patent      Post Op Vitals Reviewed: Yes      Staff: Anesthesiologist, CRNA         There were no known notable events for this encounter.     /59 (10/02/23 1122)    Temp 97.9 °F (36.6 °C) (10/02/23 1122)    Pulse 56 (10/02/23 1122)   Resp 12 (10/02/23 1122)    SpO2 100 % (10/02/23 1122)

## 2023-10-02 NOTE — H&P
History and Physical - [facility]  Rosita Friday 33 y.o. male MRN: [id number]                  HPI: Brigida Friday is a 33y.o. year old male who presents for colonoscopy for a history of Crohn's disease more than 10 years. Last colonoscopy 3 years ago      REVIEW OF SYSTEMS: Per the HPI, and otherwise unremarkable. Historical Information   Past Medical History:   Diagnosis Date   • Crohn's disease [facility])      Past Surgical History:   Procedure Laterality Date   • COLONOSCOPY     • [place] COLONOSCOPY FLX DX W/COLLJ [facility] WHEN PFRMD N/A 5/18/2017    Procedure: COLONOSCOPY;  Surgeon: Dom Alexander MD;  Location: [facility]; Service: Gastroenterology     Social History   Social History     Substance and Sexual Activity   Alcohol Use Yes    Comment: Social     Social History     Substance and Sexual Activity   Drug Use No     Social History     Tobacco Use   Smoking Status Never   Smokeless Tobacco Never     Family History   Problem Relation Age of Onset   • Diabetes Father    • Hypertension Mother        Meds/Allergies     (Not in a hospital admission)      No Known Allergies    Objective     Blood pressure 128/66, pulse 60, temperature 98 °F (36.7 °C), temperature source Tympanic, resp. rate 16, height 5' 10" (1.778 m), weight 85 kg (187 lb 6.3 oz), SpO2 100 %.       PHYSICAL EXAM    Gen: NAD  CV: RRR  CHEST: Clear  ABD: soft, NT/ND  EXT: no edema  Neuro: AAO      ASSESSMENT/PLAN:  This is a 33y.o. year old male here for history of Crohn's disease    PLAN:   Procedure: Colonoscopy

## 2023-10-02 NOTE — INTERVAL H&P NOTE
H&P reviewed. After examining the patient I find no changes in the patients condition since the H&P had been written.     Vitals:    10/02/23 1003   BP: 128/66   Pulse: 60   Resp: 16   Temp: 98 °F (36.7 °C)   SpO2: 100%

## 2023-10-05 PROCEDURE — 88312 SPECIAL STAINS GROUP 1: CPT | Performed by: STUDENT IN AN ORGANIZED HEALTH CARE EDUCATION/TRAINING PROGRAM

## 2023-10-05 PROCEDURE — 88305 TISSUE EXAM BY PATHOLOGIST: CPT | Performed by: STUDENT IN AN ORGANIZED HEALTH CARE EDUCATION/TRAINING PROGRAM

## 2023-10-17 ENCOUNTER — TELEPHONE (OUTPATIENT)
Dept: GASTROENTEROLOGY | Facility: CLINIC | Age: 28
End: 2023-10-17

## 2023-10-17 NOTE — TELEPHONE ENCOUNTER
Patients GI provider:  Dr. Bud Cevallos    Number to return call: [phone]    Reason for call: Pt calling for biopsy results from colonoscopy.     Scheduled procedure/appointment date if applicable: none

## 2024-03-27 ENCOUNTER — TELEPHONE (OUTPATIENT)
Age: 29
End: 2024-03-27

## 2024-03-27 NOTE — TELEPHONE ENCOUNTER
Patients GI provider:  Dr. Mendieta     Number to return call: [phone]     Reason for call: Pt called in 2nd time stating that Cassidy sent over a request in regards to pts Humira on 3/18/24 and again today for Dr. Mendieta to fill out. Didn't see anything in Media. Called over to the office and spoke with Geovanna. Pt disconnected the call when speaking with Geovanna. Geovanna will look into it and call pt back.      Scheduled procedure/appointment date if applicable: Apt/procedure N/A

## 2024-03-27 NOTE — TELEPHONE ENCOUNTER
Patients GI provider:  Dr. Mendieta    Number to return call: [phone]    Reason for call: Pt called in stating that Cassidy sent over a request in regards to pts Humira on 3/18/24 and again today for Dr. Mendieta to fill out. Didn't see anything in Media. Called over to the office and spoke with Geovanna. Pt disconnected the call when speaking with Geovanna. Geovanna will look into it and call pt back.     Scheduled procedure/appointment date if applicable: Apt/procedure N/A

## 2024-04-05 DIAGNOSIS — K50.919 CROHN'S DISEASE WITH COMPLICATION, UNSPECIFIED GASTROINTESTINAL TRACT LOCATION (HCC): ICD-10-CM

## 2024-04-05 NOTE — TELEPHONE ENCOUNTER
Reason for call:   [x] Refill   [] Prior Auth  [] Other:     Office:   [] PCP/Provider -   [x] Specialty/Provider - gastro      Medication: HUMIRA PEN 40 MG/0.8ML PNKT     Dose/Frequency: INJECT ONE PEN (40MG TOTAL) UNDER THE SKIN EVERY 14 DAYS.     Quantity: #4    Pharmacy: [facility]/pharmacy #[id number] [facility] [place] - [address] [place] [id number]     Does the patient have enough for 3 days?   [] Yes   [x] No - Send as HP to POD

## 2024-04-09 DIAGNOSIS — K50.919 CROHN'S DISEASE WITH COMPLICATION, UNSPECIFIED GASTROINTESTINAL TRACT LOCATION (HCC): ICD-10-CM

## 2024-04-09 NOTE — TELEPHONE ENCOUNTER
Reason for call:   [x] Refill   [] Prior Auth  [x] Other: PATIENT WOULD LIKE THIS RESEND TO A DIFFERENT [facility] AS HE WAS HAVING ISSUES GETTING IT FROM THE PREVIOUS ONE. PLEASE RESEND TO [facility]/pharmacy #[id number] [facility] [place] - [address] [place] [phone]     Office:   [] PCP/Provider -   [x] Specialty/Provider -     Medication: Adalimumab 40 MG/0.8ML PNKT     Dose/Frequency: Inject 0.8 mL (40 mg total) under the skin every 14 (fourteen) days     Quantity: 1.6ml    Pharmacy: [facility]/pharmacy #[id number] [facility] [place] - [address] [facility] [phone]     Does the patient have enough for 3 days?   [] Yes   [x] No - Send as HP to POD

## 2024-04-18 ENCOUNTER — TELEPHONE (OUTPATIENT)
Dept: GASTROENTEROLOGY | Facility: CLINIC | Age: 29
End: 2024-04-18

## 2024-04-19 NOTE — TELEPHONE ENCOUNTER
Stacy, with [employer], requests all office notes regarding pt's Chron's diagnosis prior to starting Humira including severity of his disease process. Stacy also requests a list of pt's failed biologics.    Fax: [phone]  [place] - [id number]

## 2024-04-19 NOTE — TELEPHONE ENCOUNTER
Renetta from [employer] [employer] was calling asking for info on the pt for prior auth. I did let her know that info was faxed this morning.  Renetta stated that she will look into it and did not need a call back at this time

## 2024-04-22 NOTE — TELEPHONE ENCOUNTER
Insurance has denied the authorization for Humira.     Denial reasoning below ( full letter in media):    No  documentation is provided of the following:  (1) Your doctor provides medical records (for example: chart notes, lab values) showing you have a  type of intestinal disease (moderate to severe Crohn's disease).  (2) There are paid claims or your doctor submits medical records (for example: chart notes) showing  that you did not respond fully (inadequate response) or cannot tolerate one drug from any of two of the  following groups:  (A) Corticosteroids: Budesonide (Entocort EC), prednisone, hydrocortisone, methylprednisolone.  (B) Aminosalicylates: Sulfasalazine, mesalamine (Asacol, Rowasa, Canasa, Pentasa).  (C) Immunomodulators: azathioprine, 6-mercaptopurine, cyclosporine, tacrolimus (Prograf),  methotrexate.  (D) Antibiotics: Metronidazole, levofloxacin.  The information provided does not show that you meet the criteria listed above

## 2024-04-22 NOTE — TELEPHONE ENCOUNTER
Sheree Quinones PA-C  You36 minutes ago (1:03 PM)       Hey - just reviewed as I am not familiar with this patient - he is Dr. Mendieta's patient who has been maintained on Humira and doing well for years.  They denied the Humira because of failure to provide documentation that he failed other meds - Dr. Mendieta's 2017 office note documents that he was previously on Prednisone and Mesalamine - can it be resubmitted with this info?

## 2024-05-31 ENCOUNTER — OFFICE VISIT (OUTPATIENT)
Age: 29
End: 2024-05-31
Payer: COMMERCIAL

## 2024-05-31 ENCOUNTER — TELEPHONE (OUTPATIENT)
Age: 29
End: 2024-05-31

## 2024-05-31 VITALS
WEIGHT: 184 LBS | HEART RATE: 84 BPM | BODY MASS INDEX: 26.4 KG/M2 | DIASTOLIC BLOOD PRESSURE: 80 MMHG | SYSTOLIC BLOOD PRESSURE: 110 MMHG | OXYGEN SATURATION: 96 %

## 2024-05-31 DIAGNOSIS — K50.00 CROHN'S DISEASE OF SMALL INTESTINE WITHOUT COMPLICATION (HCC): Primary | ICD-10-CM

## 2024-05-31 DIAGNOSIS — R76.11 POSITIVE PPD: ICD-10-CM

## 2024-05-31 PROCEDURE — 99213 OFFICE O/P EST LOW 20 MIN: CPT | Performed by: INTERNAL MEDICINE

## 2024-05-31 NOTE — PROGRESS NOTES
[facility]      Chief Complaint: Crohn's disease    HPI:  Usha Adame is a 28 y.o.  male who presents with history of Crohn's disease going back more than 10 years.  Patient currently is asymptomatic.  However he has had a problem with his insurance covering his medication and approving Humira.  He is now 3 injections behind and on appeal.  He has a positive PPD.  Last chest workup was normal.  He has no symptoms at the present time.      Review of Systems:   Constitutional: No fever or chills, feels well, no tiredness, no recent weight gain or weight loss.   HENT: No complaints of earache, no hearing loss, no nosebleeds, no nasal discharge, no sore throat, no hoarseness.    Eyes: No complaints of eye pain, no red eyes, no discharge from eyes, no itchy eyes.  Cardiovascular: No complaints of slow heart rate, no fast heart rate, no chest pain, no palpitations, no leg claudication, no lower extremity edema.   Respiratory: No complaints of shortness of breath, no wheezing, no cough, no SOB on exertion, no orthopnea.   Gastrointestinal: As noted in HPI  Genitourinary: No complaints of dysuria, no incontinence, no hesitancy, no nocturia.   Musculoskeletal: No complaints of arthralgia, no myalgias, no joint swelling or stiffness, no limb pain or swelling.   Neurological: No complaints of headache, no confusion, no convulsions, no numbness or tingling, no dizziness or fainting, no limb weakness, no difficulty walking.    Skin: No complaints of skin rash or skin lesions, no itching, no skin wound, no dry skin.    Hematological/Lymphatic: No complaints of swollen glands, does not bleed easy.   Allergic/Immunologic: No immunocompromised state.  Endocrine:  No complaints of polyuria, no polydipsia.   Psychiatric/Behavioral: is not suicidal, no sleep disturbances, no anxiety or depression, no change in personality, no emotional problems.       Historical Information   Past Medical History:    Diagnosis Date    Crohn's disease (HCC)      Past Surgical History:   Procedure Laterality Date    COLONOSCOPY      IA COLONOSCOPY FLX DX W/COLLJ SPEC WHEN PFRMD N/A 5/18/2017    Procedure: COLONOSCOPY;  Surgeon: Jasson Mendieta MD;  Location: [facility];  Service: Gastroenterology     Social History   Social History     Substance and Sexual Activity   Alcohol Use Yes    Alcohol/week: 1.0 standard drink of alcohol    Types: 1 Cans of beer per week    Comment: Social     Social History     Substance and Sexual Activity   Drug Use No     Social History     Tobacco Use   Smoking Status Never   Smokeless Tobacco Never     Family History   Problem Relation Age of Onset    Diabetes Father     Hypertension Mother          Current Medications: has a current medication list which includes the following prescription(s): humira pen and adalimumab.        Vital Signs: Wt 83.5 kg (184 lb)   BMI 26.40 kg/m²       Physical Exam:   Constitutional  General Appearance: No acute distress, well appearing and well nourished  Head  Normocephalic  Eyes  Conjunctivae and lids: No swelling, erythema, or discharge.    Pupils and irises: Equal, round and reactive to light.   Ears, Nose, Mouth, and Throat  External inspection of ears and nose: Normal  Nasal mucosa, septum and turbinates: Normal without edema or erythema/   Oropharynx: Normal with no erythema, edema, exudate or lesions.   Neck  Normal range of motion. Neck supple.   Cardiovascular  Auscultation of the heart: Normal rate and rhythm, normal S1 and S2 without murmurs.  Examination of the extremities for edema and/or varicosities: Normal  Pulmonary/Chest  Respiratory effort: No increased work of breathing or signs of respiratory distress.   Auscultation of lungs: Clear to auscultation, equal breath sounds bilaterally, no wheezes, rales, no rhonchi.   Abdomen  Abdomen: Non-tender, no masses.   Liver and spleen: No hepatomegaly or splenomegaly.   Musculoskeletal  Gait and  station: normal.  Digits and Nails: normal without clubbing or cyanosis.  Inspection/palpation of joints, bones, and muscles: Normal  Neurological  No nystagmus or asterixis.   Skin  Skin and subcutaneous tissue: Normal without rashes or lesions.   Lymphatic  Palpation of the lymph nodes in neck: No lymphadenopathy.   Psychiatric  Orientation to person, place and time: Normal.  Mood and affect: Normal.         Labs:  Lab Results   Component Value Date    ALT 12 (L) 04/13/2023    AST 18 04/13/2023    BUN 16 04/13/2023    CALCIUM 9.4 04/13/2023     04/13/2023    CO2 26 04/13/2023    CREATININE 1.07 04/13/2023    HCT 43.4 11/10/2019    HGB 13.7 11/10/2019     11/10/2019    K 4.8 04/13/2023    WBC 8.08 11/10/2019         X-Rays & Procedures:   XR chest pa & lateral    (Results Pending)           ______________________________________________________________________      Assessment & Plan:     Diagnoses and all orders for this visit:    Crohn's disease of small intestine without complication (HCC)  -     XR chest pa & lateral; Future  -     C-reactive protein; Future  -     CBC; Future  -     Hepatic function panel; Future    Positive PPD  -     XR chest pa & lateral; Future  -     C-reactive protein; Future  -     CBC; Future  -     Hepatic function panel; Future      Patient will undergo x-ray and blood work.  He is currently appealing his denial of the Humira.  He has 3 injections behind although he appears to be doing fairly well from a symptomatic point of view.  He will continue his current regimen.

## 2024-05-31 NOTE — TELEPHONE ENCOUNTER
Patients GI provider:  Dr. Mendieta    Number to return call: [phone]    Reason for call: Pt calling to advise he spoke w/insurance in regards to Humira. Insurance advised pt to have our office submit a new appeal. Please submit.    Scheduled procedure/appointment date if applicable: N/A

## 2024-09-03 ENCOUNTER — TELEPHONE (OUTPATIENT)
Age: 29
End: 2024-09-03

## 2024-09-03 NOTE — LETTER
September 3, 2024       Patient: Usha Adame   YOB: 1995   Date of Visit: 9/3/2024        To Whom This May Concern :       Usha Adame has been my patient for several years now due to gastrointestinal concerns. Usha has been diagnosed with Crohn's Disease of the small intestine. The patient has been symptom free due to the Amera keeping him in remission. He has documented notes in his chart of inflammatory bowel disease  not responsive to oral medications. Humira is what changed the picture and I believe that he stands a risk of recurrence if the medication is taken away. If you have any questions or concerns in regards to this matter please feel free to reach out to my office at [phone].        Sincerely,        Jasson Mendieta MD

## 2024-09-03 NOTE — TELEPHONE ENCOUNTER
Patients GI provider:  Dr. Mendieta    Number to return call: ([phone]    Reason for call: Pt calling to advise his insurance has denied the Humira stating it is due to the pt being symptom free for 7 years.  The patient is inquiring on next steps and will upload the denial letter to [employer]. Please contact the pt to advise.    Scheduled procedure/appointment date if applicable:N/A

## 2024-09-03 NOTE — TELEPHONE ENCOUNTER
Please send a letter to the insurance company stating that the reason the patient is symptom-free is because of the Amera keeping him in remission.  He has documented inflammatory bowel disease not responsive to oral medications and the Humira was what changed his picture.  I believe that he stands a risk of recurrence if the medication is taken away.

## 2024-10-16 ENCOUNTER — TELEPHONE (OUTPATIENT)
Age: 29
End: 2024-10-16

## 2024-11-05 ENCOUNTER — TELEPHONE (OUTPATIENT)
Age: 29
End: 2024-11-05

## 2024-11-05 DIAGNOSIS — K50.919 CROHN'S DISEASE WITH COMPLICATION, UNSPECIFIED GASTROINTESTINAL TRACT LOCATION (HCC): ICD-10-CM

## 2024-11-05 NOTE — TELEPHONE ENCOUNTER
Patient needs a refill on his Humira but it is unable to be qued up in the chart. Please advise and send over to the patients specialty pharmacy.

## 2024-11-07 NOTE — TELEPHONE ENCOUNTER
===View-only below this line===  ----- Message -----  From: Jasson Mendieta MD  Sent: 11/7/2024  11:38 AM EST      Okay to start with the regular dose, no need to redo a loading dose, thank you

## 2024-11-07 NOTE — TELEPHONE ENCOUNTER
Called patient, reached voicemail, left message to call back to confirm when pts last dose of Humira was and can then send message to provider if loading dose is recommended.

## 2024-11-07 NOTE — TELEPHONE ENCOUNTER
I called and spoke with Jason ESTEVES at [facility], Relayed provider recommendation for patient to start with maintenance dosing and not to reload Humira. [facility] to call the patient to set up delivery of Humira.

## 2024-11-07 NOTE — TELEPHONE ENCOUNTER
[facility] calling in today because patient has been off his Humira for 3-4 months.  Confirming if he will need a loading dose or if they will start him back on his maintenance dose.  Please review and advise.

## 2024-11-08 NOTE — TELEPHONE ENCOUNTER
Called patient, reached voicemail, left message to call back to inform the patient [facility] should be calling to schedule delivery and to provide patient with their pharmacy number if he does not hear from the pharmacy.

## 2024-11-13 ENCOUNTER — PATIENT MESSAGE (OUTPATIENT)
Age: 29
End: 2024-11-13

## 2024-11-13 NOTE — PATIENT COMMUNICATION
I spoke with the patient. Patient states he was delivered Humira pens. Pt aware can take first Humira pen today. All questions answered at this time. Pt to contact [facility] regarding amount of Humira pens to be delivered as patient received 2 pens and prescription was for a quantity of 12 pens.

## 2024-11-13 NOTE — PATIENT COMMUNICATION
Patient called and and had some additional questions, I transferred to RN Silke to further assist thank you

## 2024-11-15 NOTE — TELEPHONE ENCOUNTER
Emily SPICER    11/13/24 12:51 PM  Note     I spoke with the patient. Patient states he was delivered Humira pens. Pt aware can take first Humira pen today. All questions answered at this time. Pt to contact [facility] regarding amount of Humira pens to be delivered as patient received 2 pens and prescription was for a quantity of 12 pens.

## 2025-01-27 ENCOUNTER — TELEPHONE (OUTPATIENT)
Age: 30
End: 2025-01-27

## 2025-01-27 NOTE — TELEPHONE ENCOUNTER
Tracee, with [facility], reports pt has a new ID number. Because of this, a new PA for Humira 40 mg Q 14 days is required. Pt's insurance the provider has not changed.    ID: [id number]    Tracee notes the ID number may need to be submitted without the preceding letters.

## 2025-01-28 NOTE — TELEPHONE ENCOUNTER
Insurance has denied Humira because preferred product is biosimilar Adalimumab-aacf.     Okay to change to biosimilar?

## 2025-01-28 NOTE — TELEPHONE ENCOUNTER
PA team with insurance calling stating a fax was sent with additional clinical questions. Fax was sent yesterday,.

## 2025-01-28 NOTE — TELEPHONE ENCOUNTER
[facility] calling to ask if pt has been on Adilimumab ACFC- do not see this in med list but will forward to IBD team for assistance.     GILA Urrutia, [facility] [phone]

## 2025-01-30 DIAGNOSIS — K50.10 CROHN'S DISEASE OF LARGE INTESTINE WITHOUT COMPLICATION (HCC): Primary | ICD-10-CM

## 2025-01-30 NOTE — TELEPHONE ENCOUNTER
Left patient a vm indicating that I will be leaving him a myChart message regarding a change of medication.

## 2025-02-03 NOTE — TELEPHONE ENCOUNTER
[facility] has questions on dosage and timing please call them back at [phone] to speak to the pharmacist

## 2025-02-05 ENCOUNTER — TELEPHONE (OUTPATIENT)
Age: 30
End: 2025-02-05

## 2025-02-05 NOTE — TELEPHONE ENCOUNTER
Call from [facility] asking for a call back from the office to clarify the sig on the script for the adalimumab.     Please call [phone]

## 2025-02-06 NOTE — TELEPHONE ENCOUNTER
Patient called the [facility] about a follow up on the medication clarification. Patient would like to know when this can be taken care of so the pharmacy can send out the medication